# Patient Record
Sex: FEMALE | Race: WHITE | NOT HISPANIC OR LATINO | Employment: UNEMPLOYED | URBAN - METROPOLITAN AREA
[De-identification: names, ages, dates, MRNs, and addresses within clinical notes are randomized per-mention and may not be internally consistent; named-entity substitution may affect disease eponyms.]

---

## 2018-04-27 ENCOUNTER — OFFICE VISIT (OUTPATIENT)
Dept: FAMILY MEDICINE CLINIC | Facility: CLINIC | Age: 38
End: 2018-04-27

## 2018-04-27 VITALS
BODY MASS INDEX: 21.85 KG/M2 | HEART RATE: 70 BPM | SYSTOLIC BLOOD PRESSURE: 100 MMHG | OXYGEN SATURATION: 98 % | HEIGHT: 63 IN | TEMPERATURE: 98.1 F | DIASTOLIC BLOOD PRESSURE: 68 MMHG | WEIGHT: 123.3 LBS

## 2018-04-27 DIAGNOSIS — J02.9 ACUTE PHARYNGITIS, UNSPECIFIED ETIOLOGY: Primary | ICD-10-CM

## 2018-04-27 PROCEDURE — 99213 OFFICE O/P EST LOW 20 MIN: CPT | Performed by: NURSE PRACTITIONER

## 2018-04-27 NOTE — PROGRESS NOTES
Subjective   Lilia Anders is a 38 y.o. female.     Strep throat tue  [facility]  tx augmentin    mon scratchy throat  tua am star  tue fever 100.3  yest same  tyl helps      No resp sym no cough    Gets strep several   Never       Tonsils age 7                     Sore Throat    Pertinent negatives include no coughing or shortness of breath.        The following portions of the patient's history were reviewed and updated as appropriate: allergies, current medications, past family history, past medical history, past surgical history and problem list.    Review of Systems   Constitutional: Negative for chills, fatigue and fever.   HENT: Positive for sore throat.    Eyes: Negative.    Respiratory: Negative for cough and shortness of breath.    Genitourinary: Negative.    Musculoskeletal: Negative.    Skin: Negative.    Neurological: Negative.    Psychiatric/Behavioral: Negative.        Objective   Physical Exam   Constitutional: She is oriented to person, place, and time. She appears well-developed and well-nourished.   HENT:   Head: Normocephalic.   Nose: Nose normal.   Mouth/Throat: Oropharynx is clear and moist. No oropharyngeal exudate.   Mild erythema pharynx palpitation uvula midline tonsils normal speech clear   Eyes: Conjunctivae are normal. Pupils are equal, round, and reactive to light.   Neck: Neck supple.   Cardiovascular: Normal rate, regular rhythm and normal heart sounds.  Exam reveals no friction rub.    No murmur heard.  Pulmonary/Chest: Effort normal and breath sounds normal. No respiratory distress. She has no wheezes.   Abdominal: Soft. Bowel sounds are normal.   No hepatosplenomegaly    Musculoskeletal: She exhibits no edema.   Lymphadenopathy:     She has no cervical adenopathy.   Neurological: She is alert and oriented to person, place, and time.   Skin: Skin is warm and dry.   Psychiatric: She has a normal mood and affect. Her behavior is normal. Judgment and thought content normal.    Vitals reviewed.        Assessment/Plan   Lilia was seen today for sore throat.    Diagnoses and all orders for this visit:    Acute pharyngitis, unspecified etiology                  Patient is pharyngitis  Finish Augmentin  If not better by tomorrow likely viral but  Would expect should get better over the next several days  Otherwise recheck  Any difficulty unable swallow  -Fever  Severe headache chest pain shortness of breath urgent recheck ER

## 2018-05-07 ENCOUNTER — OFFICE VISIT (OUTPATIENT)
Dept: FAMILY MEDICINE CLINIC | Facility: CLINIC | Age: 38
End: 2018-05-07

## 2018-05-07 VITALS
HEIGHT: 63 IN | SYSTOLIC BLOOD PRESSURE: 104 MMHG | TEMPERATURE: 98 F | OXYGEN SATURATION: 99 % | DIASTOLIC BLOOD PRESSURE: 84 MMHG | HEART RATE: 63 BPM | BODY MASS INDEX: 21.81 KG/M2 | WEIGHT: 123.1 LBS

## 2018-05-07 DIAGNOSIS — J02.9 SORE THROAT: ICD-10-CM

## 2018-05-07 DIAGNOSIS — Z00.00 HEALTH CARE MAINTENANCE: ICD-10-CM

## 2018-05-07 DIAGNOSIS — J02.0 STREP PHARYNGITIS: Primary | ICD-10-CM

## 2018-05-07 LAB
EXPIRATION DATE: NORMAL
INTERNAL CONTROL: NORMAL
Lab: NORMAL
S PYO AG THROAT QL: NEGATIVE

## 2018-05-07 PROCEDURE — 99213 OFFICE O/P EST LOW 20 MIN: CPT | Performed by: FAMILY MEDICINE

## 2018-05-07 PROCEDURE — 87880 STREP A ASSAY W/OPTIC: CPT | Performed by: FAMILY MEDICINE

## 2018-05-07 RX ORDER — PENICILLIN V POTASSIUM 500 MG/1
500 TABLET ORAL 3 TIMES DAILY
Qty: 30 TABLET | Refills: 0 | Status: SHIPPED | OUTPATIENT
Start: 2018-05-07 | End: 2018-06-08

## 2018-05-07 RX ORDER — FLUCONAZOLE 150 MG/1
150 TABLET ORAL ONCE
Qty: 1 TABLET | Refills: 0 | Status: SHIPPED | OUTPATIENT
Start: 2018-05-07 | End: 2018-05-07

## 2018-05-07 NOTE — PROGRESS NOTES
"Subjective   Lilia Anders is a 38 y.o. female.     Chief Complaint   Patient presents with   • Sore Throat     on and off alot this year. has had strep x times this year         History of Present Illness    Sore throat since this morning.  Patient has had 2 previous documented cases of strep throat in April.  Went to [facility].  Positive rapid strep test.  Treated with amoxicillin both times.  She has in the past had fever and chills with this.  But none today.  She is leaving for a vacation in 2 days, going on a long bicycle trip for a .  She does have some psychosocial stressors.  She is worried she might have \"throat cancer\".  She has an appointment with urology throat doctor, Dr. Dawson, tomorrow.  Previous tonsillectomy as a child.        The following portions of the patient's history were reviewed and updated as appropriate: allergies, current medications, past family history, past medical history, past social history, past surgical history and problem list.          Review of Systems   Constitutional: Negative for fever.   HENT: Positive for sore throat. Negative for congestion.    Respiratory: Negative.    Gastrointestinal: Negative.        Objective   Blood pressure 104/84, pulse 63, temperature 98 °F (36.7 °C), temperature source Oral, height 160.5 cm (63.19\"), weight 55.8 kg (123 lb 1.6 oz), SpO2 99 %.  Physical Exam   Constitutional: No distress.   No acute distress.  Nontoxic.   HENT:   Right Ear: Tympanic membrane, external ear and ear canal normal.   Left Ear: Tympanic membrane, external ear and ear canal normal.   Nose: Nose normal.   There is some residual tonsillar tissue bilaterally.  Right side is moderately inflamed with some erythema but no exudates.   Eyes: Conjunctivae are normal. Right eye exhibits no discharge. Left eye exhibits no discharge. No scleral icterus.   Neck:   No cervical lymphadenopathy   Cardiovascular: Normal rate.    Pulmonary/Chest: Effort normal and breath " sounds normal. No stridor. No respiratory distress. She has no wheezes. She has no rales.   No tachypnea   Lymphadenopathy:     She has no cervical adenopathy.   Skin: No rash noted.   Nursing note and vitals reviewed.    Rapid strep is negative.    Assessment/Plan   Lilia was seen today for sore throat.    Diagnoses and all orders for this visit:    Strep pharyngitis    Sore throat  -     POC Rapid Strep A  -     Beta Strep Culture, Throat - Swab, Throat    Health care maintenance  -     CBC & Differential; Future  -     Comprehensive Metabolic Panel; Future  -     Lipid Panel; Future  -     TSH Rfx On Abnormal To Free T4; Future    Other orders  -     penicillin v potassium (VEETID) 500 MG tablet; Take 1 tablet by mouth 3 (Three) Times a Day.  -     fluconazole (DIFLUCAN) 150 MG tablet; Take 1 tablet by mouth 1 (One) Time for 1 dose.      Pharyngitis.  Possible early strep pharyngitis the false negative rapid strep.  She has had 2 previous reported positive rapid strep sent April with symptoms.  Responded to treatment.  At this time I'm concerned that one of her boys may have strep carrier state.  Going to go ahead and start treating with penicillin 3 times a day.  She has tolerated amoxicillin and Augmentin the past.  Sending off a group a strep culture.  If negative stop antibiotics.  I also gave her Diflucan she can take if needed for yeast infection.  Counseling given today with regards to psychosocial stressors.  I recommend that she keep her appointment with her ENT scheduled for tomorrow.

## 2018-05-09 LAB — S PYO THROAT QL CULT: POSITIVE

## 2018-06-08 ENCOUNTER — OFFICE VISIT (OUTPATIENT)
Dept: SURGERY | Facility: CLINIC | Age: 38
End: 2018-06-08

## 2018-06-08 VITALS — OXYGEN SATURATION: 98 % | WEIGHT: 117.8 LBS | HEIGHT: 63 IN | HEART RATE: 71 BPM | BODY MASS INDEX: 20.87 KG/M2

## 2018-06-08 DIAGNOSIS — K42.9 UMBILICAL HERNIA WITHOUT OBSTRUCTION AND WITHOUT GANGRENE: Primary | ICD-10-CM

## 2018-06-08 PROCEDURE — 99203 OFFICE O/P NEW LOW 30 MIN: CPT | Performed by: SURGERY

## 2018-06-08 RX ORDER — CEFAZOLIN SODIUM 2 G/100ML
2 INJECTION, SOLUTION INTRAVENOUS ONCE
Status: CANCELLED | OUTPATIENT
Start: 2018-06-15 | End: 2018-06-15

## 2018-06-08 NOTE — PROGRESS NOTES
General Surgery  Initial Office Visit    CC: Hernia    HPI: The patient is a pleasant 38 y.o. year-old lady who presents today for evaluation of a possible superimposed vocal hernia that she noticed about 3 weeks ago.  She states that she does a lot of heavy lifting at the gym and noticed a slight bulge just above her umbilicus.  The bulge has been intermittent in frequency and seems to disappear when she lays flat at night to go to sleep.  She has had no associated abdominal pain, nausea, vomiting, or diarrhea.  She was referred to see me for possible and vocal hernia repair.    Past Medical History: None    Past Surgical History: None    Medications: Multivitamin once daily    Allergies: Shellfish, cefaclor    Family History: No family history of gastrointestinal malignancy    Social History: , has 2 children, newly engaged and planning to get  in July (her fiancé is currently deployed), stay-at-home mom, nonsmoker, social alcohol use    ROS:   Constitutional: Negative for fevers or chills  HENT: Positive for hearing loss and tinnitus; Negative for runny nose  Eyes: Negative for vision changes or scleral icterus  Respiratory: Negative for cough or shortness of breath  Cardiovascular: Negative for chest pain or heart palpitations  Gastrointestinal: Negative for abdominal pain, nausea, vomiting, constipation, melena, or hematochezia  Genitourinary: Negative for hematuria or dysuria  Musculoskeletal: Negative for joint pain or back pain  Neurologic: Negative for headaches or dizziness  Psychiatric: Negative for anxiety or depression  All other systems reviewed and negative    Physical Exam:  Vitals:    06/08/18 0903   Pulse: 71   SpO2: 98%     General: No acute distress, well-nourished & well-developed  HEAD: normocephalic, atraumatic  EYES: normal conjunctiva, sclera anicteric  EARS: grossly normal hearing  NECK: supple, no thyromegaly  CARDIOVASCULAR: regular rate and rhythm  RESPIRATORY: clear to  auscultation bilaterally  GASTROINTESTINAL: soft, nontender, non-distended, Soft and reducible super umbilical hernia that measures less than 1 cm at the fascia  MUSCULOSKELETAL: normal gait and station. No gross extremity abnormalities  PSYCHIATRIC: oriented x3, normal mood and affect    ASSESSMENT & PLAN  Ms. Anders is a 38 showed lady with a small and reducible supraumbilical hernia.  I have recommended we proceed with open hernia repair, with the understanding that the hernia is so small she will not require any mesh.  I advised her that after surgery she'll need to avoid lifting anything heavier than 15 pounds for 6 weeks but can otherwise return to all normal activities.  She is planning to get  in July once her fiancé returns from deployment, and is unsure if she wishes to have the surgery before the wedding or after her honeymoon.  She will call us back when she is ready to schedule the surgery.    Shahida Ellis MD  General and Endoscopic Surgery  [facility]    [address], [address]  [place], [place], [address]  P: [phone]  F: [phone]

## 2018-06-12 ENCOUNTER — APPOINTMENT (OUTPATIENT)
Dept: PREADMISSION TESTING | Facility: HOSPITAL | Age: 38
End: 2018-06-12

## 2018-06-12 VITALS
HEART RATE: 61 BPM | BODY MASS INDEX: 21.09 KG/M2 | DIASTOLIC BLOOD PRESSURE: 71 MMHG | RESPIRATION RATE: 20 BRPM | OXYGEN SATURATION: 100 % | SYSTOLIC BLOOD PRESSURE: 108 MMHG | TEMPERATURE: 97.5 F | HEIGHT: 63 IN | WEIGHT: 119 LBS

## 2018-06-12 DIAGNOSIS — K42.9 UMBILICAL HERNIA WITHOUT OBSTRUCTION AND WITHOUT GANGRENE: ICD-10-CM

## 2018-06-12 LAB
ANION GAP SERPL CALCULATED.3IONS-SCNC: 11.6 MMOL/L
BUN BLD-MCNC: 16 MG/DL (ref 6–20)
BUN/CREAT SERPL: 15.5 (ref 7–25)
CALCIUM SPEC-SCNC: 9.3 MG/DL (ref 8.6–10.5)
CHLORIDE SERPL-SCNC: 99 MMOL/L (ref 98–107)
CO2 SERPL-SCNC: 30.4 MMOL/L (ref 22–29)
CREAT BLD-MCNC: 1.03 MG/DL (ref 0.57–1)
DEPRECATED RDW RBC AUTO: 42.1 FL (ref 37–54)
ERYTHROCYTE [DISTWIDTH] IN BLOOD BY AUTOMATED COUNT: 11.9 % (ref 11.7–13)
GFR SERPL CREATININE-BSD FRML MDRD: 61 ML/MIN/1.73
GLUCOSE BLD-MCNC: 60 MG/DL (ref 65–99)
HCT VFR BLD AUTO: 44.3 % (ref 35.6–45.5)
HGB BLD-MCNC: 14.7 G/DL (ref 11.9–15.5)
MCH RBC QN AUTO: 32.2 PG (ref 26.9–32)
MCHC RBC AUTO-ENTMCNC: 33.2 G/DL (ref 32.4–36.3)
MCV RBC AUTO: 96.9 FL (ref 80.5–98.2)
PLATELET # BLD AUTO: 261 10*3/MM3 (ref 140–500)
PMV BLD AUTO: 10 FL (ref 6–12)
POTASSIUM BLD-SCNC: 4.3 MMOL/L (ref 3.5–5.2)
RBC # BLD AUTO: 4.57 10*6/MM3 (ref 3.9–5.2)
SODIUM BLD-SCNC: 141 MMOL/L (ref 136–145)
WBC NRBC COR # BLD: 4.57 10*3/MM3 (ref 4.5–10.7)

## 2018-06-12 PROCEDURE — 85027 COMPLETE CBC AUTOMATED: CPT | Performed by: SURGERY

## 2018-06-12 PROCEDURE — 80048 BASIC METABOLIC PNL TOTAL CA: CPT | Performed by: SURGERY

## 2018-06-12 PROCEDURE — 36415 COLL VENOUS BLD VENIPUNCTURE: CPT

## 2018-06-12 NOTE — DISCHARGE INSTRUCTIONS
Take the following medications the morning of surgery with a small sip of water:    NONE    ARRIVE TO OUT SURGERY CENTER AT 11 A.M.    General Instructions:  • Do not eat solid food after midnight the night before surgery.  • You may drink clear liquids day of surgery but must stop at least one hour before your hospital arrival time.  • It is beneficial for you to have a clear drink that contains carbohydrates the day of surgery.  We suggest a 12 to 20 ounce bottle of Gatorade or Powerade for non-diabetic patients or a 12 to 20 ounce bottle of G2 or Powerade Zero for diabetic patients. (Pediatric patients, are not advised to drink a 12 to 20 ounce carbohydrate drink)    Clear liquids are liquids you can see through.  Nothing red in color.     Plain water                               Sports drinks  Sodas                                   Gelatin (Jell-O)  Fruit juices without pulp such as white grape juice and apple juice  Popsicles that contain no fruit or yogurt  Tea or coffee (no cream or milk added)  Gatorade / Powerade  G2 / Powerade Zero    • Infants may have breast milk up to four hours before surgery.  • Infants drinking formula may drink formula up to six hours before surgery.   • Patients who avoid smoking, chewing tobacco and alcohol for 4 weeks prior to surgery have a reduced risk of post-operative complications.  Quit smoking as many days before surgery as you can.  • Do not smoke, use chewing tobacco or drink alcohol the day of surgery.   • If applicable bring your C-PAP/ BI-PAP machine.  • Bring any papers given to you in the doctor’s office.  • Wear clean comfortable clothes and socks.  • Do not wear contact lenses or make-up.  Bring a case for your glasses.   • Bring crutches or walker if applicable.  • Remove all piercings.  Leave jewelry and any other valuables at home.  • Hair extensions with metal clips must be removed prior to surgery.  • The Pre-Admission Testing nurse will instruct you to  bring medications if unable to obtain an accurate list in Pre-Admission Testing.          Preventing a Surgical Site Infection:  • For 2 to 3 days before surgery, avoid shaving with a razor because the razor can irritate skin and make it easier to develop an infection.  • The night prior to surgery sleep in a clean bed with clean clothing.  Do not allow pets to sleep with you.  • Shower on the morning of surgery using a fresh bar of anti-bacterial soap (such as Dial) and clean washcloth.  Dry with a clean towel and dress in clean clothing.  • Ask your surgeon if you will be receiving antibiotics prior to surgery.  • Make sure you, your family, and all healthcare providers clean their hands with soap and water or an alcohol based hand  before caring for you or your wound.    Day of surgery:  Upon arrival, a Pre-op nurse and Anesthesiologist will review your health history, obtain vital signs, and answer questions you may have.  The only belongings needed at this time will be your home medications and if applicable your C-PAP/BI-PAP machine.  If you are staying overnight your family can leave the rest of your belongings in the car and bring them to your room later.  A Pre-op nurse will start an IV and you may receive medication in preparation for surgery, including something to help you relax.  Your family will be able to see you in the Pre-op area.  While you are in surgery your family should notify the waiting room  if they leave the waiting room area and provide a contact phone number.    Please be aware that surgery does come with discomfort.  We want to make every effort to control your discomfort so please discuss any uncontrolled symptoms with your nurse.   Your doctor will most likely have prescribed pain medications.      If you are going home after surgery you will receive individualized written care instructions before being discharged.  A responsible adult must drive you to and from  the hospital on the day of your surgery and stay with you for 24 hours.    If you are staying overnight following surgery, you will be transported to your hospital room following the recovery period.  [facility] has all private rooms.    If you have any questions please call Pre-Admission Testing at [phone].  Deductibles and co-payments are collected on the day of service. Please be prepared to pay the required co-pay, deductible or deposit on the day of service as defined by your plan.

## 2018-06-15 ENCOUNTER — ANESTHESIA (OUTPATIENT)
Dept: PERIOP | Facility: HOSPITAL | Age: 38
End: 2018-06-15

## 2018-06-15 ENCOUNTER — HOSPITAL ENCOUNTER (OUTPATIENT)
Facility: HOSPITAL | Age: 38
Setting detail: HOSPITAL OUTPATIENT SURGERY
Discharge: HOME | End: 2018-06-15
Attending: SURGERY | Admitting: SURGERY

## 2018-06-15 ENCOUNTER — ANESTHESIA EVENT (OUTPATIENT)
Dept: PERIOP | Facility: HOSPITAL | Age: 38
End: 2018-06-15

## 2018-06-15 VITALS
RESPIRATION RATE: 16 BRPM | OXYGEN SATURATION: 99 % | TEMPERATURE: 98.9 F | HEART RATE: 71 BPM | DIASTOLIC BLOOD PRESSURE: 83 MMHG | SYSTOLIC BLOOD PRESSURE: 116 MMHG

## 2018-06-15 DIAGNOSIS — K42.9 UMBILICAL HERNIA WITHOUT OBSTRUCTION AND WITHOUT GANGRENE: ICD-10-CM

## 2018-06-15 PROBLEM — K43.9 VENTRAL HERNIA WITHOUT OBSTRUCTION OR GANGRENE: Status: ACTIVE | Noted: 2018-06-08

## 2018-06-15 LAB
B-HCG UR QL: NEGATIVE
INTERNAL NEGATIVE CONTROL: NEGATIVE
INTERNAL POSITIVE CONTROL: POSITIVE
Lab: NORMAL

## 2018-06-15 PROCEDURE — 25010000003 CEFAZOLIN IN DEXTROSE 2-4 GM/100ML-% SOLUTION: Performed by: SURGERY

## 2018-06-15 PROCEDURE — 25010000002 DEXAMETHASONE PER 1 MG: Performed by: NURSE ANESTHETIST, CERTIFIED REGISTERED

## 2018-06-15 PROCEDURE — 25010000002 PROPOFOL 10 MG/ML EMULSION: Performed by: NURSE ANESTHETIST, CERTIFIED REGISTERED

## 2018-06-15 PROCEDURE — 49561 PR REPAIR INCISIONAL HERNIA,STRANG: CPT | Performed by: SURGERY

## 2018-06-15 PROCEDURE — 25010000002 FENTANYL CITRATE (PF) 100 MCG/2ML SOLUTION: Performed by: NURSE ANESTHETIST, CERTIFIED REGISTERED

## 2018-06-15 PROCEDURE — 25010000002 MIDAZOLAM PER 1 MG: Performed by: ANESTHESIOLOGY

## 2018-06-15 PROCEDURE — 25010000002 FENTANYL CITRATE (PF) 100 MCG/2ML SOLUTION: Performed by: ANESTHESIOLOGY

## 2018-06-15 PROCEDURE — 25010000002 ONDANSETRON PER 1 MG: Performed by: NURSE ANESTHETIST, CERTIFIED REGISTERED

## 2018-06-15 RX ORDER — FLUMAZENIL 0.1 MG/ML
0.2 INJECTION INTRAVENOUS AS NEEDED
Status: DISCONTINUED | OUTPATIENT
Start: 2018-06-15 | End: 2018-06-15 | Stop reason: HOSPADM

## 2018-06-15 RX ORDER — CEFAZOLIN SODIUM 2 G/100ML
2 INJECTION, SOLUTION INTRAVENOUS ONCE
Status: COMPLETED | OUTPATIENT
Start: 2018-06-15 | End: 2018-06-15

## 2018-06-15 RX ORDER — PROMETHAZINE HYDROCHLORIDE 25 MG/ML
6.25 INJECTION, SOLUTION INTRAMUSCULAR; INTRAVENOUS ONCE AS NEEDED
Status: DISCONTINUED | OUTPATIENT
Start: 2018-06-15 | End: 2018-06-15 | Stop reason: HOSPADM

## 2018-06-15 RX ORDER — PROPOFOL 10 MG/ML
VIAL (ML) INTRAVENOUS AS NEEDED
Status: DISCONTINUED | OUTPATIENT
Start: 2018-06-15 | End: 2018-06-15 | Stop reason: SURG

## 2018-06-15 RX ORDER — DIPHENHYDRAMINE HYDROCHLORIDE 50 MG/ML
6.25 INJECTION INTRAMUSCULAR; INTRAVENOUS
Status: DISCONTINUED | OUTPATIENT
Start: 2018-06-15 | End: 2018-06-15 | Stop reason: HOSPADM

## 2018-06-15 RX ORDER — LIDOCAINE HYDROCHLORIDE 10 MG/ML
0.5 INJECTION, SOLUTION EPIDURAL; INFILTRATION; INTRACAUDAL; PERINEURAL ONCE AS NEEDED
Status: DISCONTINUED | OUTPATIENT
Start: 2018-06-15 | End: 2018-06-15 | Stop reason: HOSPADM

## 2018-06-15 RX ORDER — OXYCODONE HYDROCHLORIDE AND ACETAMINOPHEN 5; 325 MG/1; MG/1
2 TABLET ORAL ONCE AS NEEDED
Status: DISCONTINUED | OUTPATIENT
Start: 2018-06-15 | End: 2018-06-15 | Stop reason: HOSPADM

## 2018-06-15 RX ORDER — MAGNESIUM HYDROXIDE 1200 MG/15ML
LIQUID ORAL AS NEEDED
Status: DISCONTINUED | OUTPATIENT
Start: 2018-06-15 | End: 2018-06-15 | Stop reason: HOSPADM

## 2018-06-15 RX ORDER — MIDAZOLAM HYDROCHLORIDE 1 MG/ML
1 INJECTION INTRAMUSCULAR; INTRAVENOUS
Status: DISCONTINUED | OUTPATIENT
Start: 2018-06-15 | End: 2018-06-15 | Stop reason: HOSPADM

## 2018-06-15 RX ORDER — PROMETHAZINE HYDROCHLORIDE 25 MG/1
25 TABLET ORAL ONCE AS NEEDED
Status: DISCONTINUED | OUTPATIENT
Start: 2018-06-15 | End: 2018-06-15 | Stop reason: HOSPADM

## 2018-06-15 RX ORDER — FAMOTIDINE 10 MG/ML
20 INJECTION, SOLUTION INTRAVENOUS ONCE
Status: COMPLETED | OUTPATIENT
Start: 2018-06-15 | End: 2018-06-15

## 2018-06-15 RX ORDER — HYDROMORPHONE HYDROCHLORIDE 1 MG/ML
0.5 INJECTION, SOLUTION INTRAMUSCULAR; INTRAVENOUS; SUBCUTANEOUS
Status: DISCONTINUED | OUTPATIENT
Start: 2018-06-15 | End: 2018-06-15 | Stop reason: HOSPADM

## 2018-06-15 RX ORDER — FENTANYL CITRATE 50 UG/ML
INJECTION, SOLUTION INTRAMUSCULAR; INTRAVENOUS AS NEEDED
Status: DISCONTINUED | OUTPATIENT
Start: 2018-06-15 | End: 2018-06-15 | Stop reason: SURG

## 2018-06-15 RX ORDER — FENTANYL CITRATE 50 UG/ML
50 INJECTION, SOLUTION INTRAMUSCULAR; INTRAVENOUS
Status: DISCONTINUED | OUTPATIENT
Start: 2018-06-15 | End: 2018-06-15 | Stop reason: HOSPADM

## 2018-06-15 RX ORDER — TRAMADOL HYDROCHLORIDE 50 MG/1
50 TABLET ORAL EVERY 6 HOURS PRN
Qty: 20 TABLET | Refills: 0 | Status: SHIPPED | OUTPATIENT
Start: 2018-06-15 | End: 2018-07-06

## 2018-06-15 RX ORDER — PROMETHAZINE HYDROCHLORIDE 25 MG/1
25 SUPPOSITORY RECTAL ONCE AS NEEDED
Status: DISCONTINUED | OUTPATIENT
Start: 2018-06-15 | End: 2018-06-15 | Stop reason: HOSPADM

## 2018-06-15 RX ORDER — SODIUM CHLORIDE, SODIUM LACTATE, POTASSIUM CHLORIDE, CALCIUM CHLORIDE 600; 310; 30; 20 MG/100ML; MG/100ML; MG/100ML; MG/100ML
9 INJECTION, SOLUTION INTRAVENOUS CONTINUOUS
Status: DISCONTINUED | OUTPATIENT
Start: 2018-06-15 | End: 2018-06-15 | Stop reason: HOSPADM

## 2018-06-15 RX ORDER — HYDROCODONE BITARTRATE AND ACETAMINOPHEN 7.5; 325 MG/1; MG/1
1 TABLET ORAL ONCE AS NEEDED
Status: COMPLETED | OUTPATIENT
Start: 2018-06-15 | End: 2018-06-15

## 2018-06-15 RX ORDER — BUPIVACAINE HYDROCHLORIDE AND EPINEPHRINE 5; 5 MG/ML; UG/ML
INJECTION, SOLUTION PERINEURAL AS NEEDED
Status: DISCONTINUED | OUTPATIENT
Start: 2018-06-15 | End: 2018-06-15 | Stop reason: HOSPADM

## 2018-06-15 RX ORDER — ONDANSETRON 2 MG/ML
4 INJECTION INTRAMUSCULAR; INTRAVENOUS ONCE AS NEEDED
Status: DISCONTINUED | OUTPATIENT
Start: 2018-06-15 | End: 2018-06-15 | Stop reason: HOSPADM

## 2018-06-15 RX ORDER — LABETALOL HYDROCHLORIDE 5 MG/ML
5 INJECTION, SOLUTION INTRAVENOUS
Status: DISCONTINUED | OUTPATIENT
Start: 2018-06-15 | End: 2018-06-15 | Stop reason: HOSPADM

## 2018-06-15 RX ORDER — EPHEDRINE SULFATE 50 MG/ML
5 INJECTION, SOLUTION INTRAVENOUS ONCE AS NEEDED
Status: DISCONTINUED | OUTPATIENT
Start: 2018-06-15 | End: 2018-06-15 | Stop reason: HOSPADM

## 2018-06-15 RX ORDER — SODIUM CHLORIDE 0.9 % (FLUSH) 0.9 %
1-10 SYRINGE (ML) INJECTION AS NEEDED
Status: DISCONTINUED | OUTPATIENT
Start: 2018-06-15 | End: 2018-06-15 | Stop reason: HOSPADM

## 2018-06-15 RX ORDER — ROCURONIUM BROMIDE 10 MG/ML
INJECTION, SOLUTION INTRAVENOUS AS NEEDED
Status: DISCONTINUED | OUTPATIENT
Start: 2018-06-15 | End: 2018-06-15 | Stop reason: SURG

## 2018-06-15 RX ORDER — DEXAMETHASONE SODIUM PHOSPHATE 10 MG/ML
INJECTION INTRAMUSCULAR; INTRAVENOUS AS NEEDED
Status: DISCONTINUED | OUTPATIENT
Start: 2018-06-15 | End: 2018-06-15 | Stop reason: SURG

## 2018-06-15 RX ORDER — ONDANSETRON 2 MG/ML
INJECTION INTRAMUSCULAR; INTRAVENOUS AS NEEDED
Status: DISCONTINUED | OUTPATIENT
Start: 2018-06-15 | End: 2018-06-15 | Stop reason: SURG

## 2018-06-15 RX ORDER — MIDAZOLAM HYDROCHLORIDE 1 MG/ML
2 INJECTION INTRAMUSCULAR; INTRAVENOUS
Status: DISCONTINUED | OUTPATIENT
Start: 2018-06-15 | End: 2018-06-15 | Stop reason: HOSPADM

## 2018-06-15 RX ORDER — FENTANYL CITRATE 50 UG/ML
100 INJECTION, SOLUTION INTRAMUSCULAR; INTRAVENOUS
Status: DISCONTINUED | OUTPATIENT
Start: 2018-06-15 | End: 2018-06-15 | Stop reason: HOSPADM

## 2018-06-15 RX ADMIN — ONDANSETRON 4 MG: 2 INJECTION INTRAMUSCULAR; INTRAVENOUS at 14:00

## 2018-06-15 RX ADMIN — ROCURONIUM BROMIDE 35 MG: 10 INJECTION INTRAVENOUS at 13:30

## 2018-06-15 RX ADMIN — MIDAZOLAM 2 MG: 1 INJECTION INTRAMUSCULAR; INTRAVENOUS at 13:30

## 2018-06-15 RX ADMIN — HYDROCODONE BITARTRATE AND ACETAMINOPHEN 1 TABLET: 7.5; 325 TABLET ORAL at 14:46

## 2018-06-15 RX ADMIN — MIDAZOLAM 2 MG: 1 INJECTION INTRAMUSCULAR; INTRAVENOUS at 12:08

## 2018-06-15 RX ADMIN — SUGAMMADEX 150 MG: 100 INJECTION, SOLUTION INTRAVENOUS at 13:54

## 2018-06-15 RX ADMIN — FENTANYL CITRATE 100 MCG: 50 INJECTION INTRAMUSCULAR; INTRAVENOUS at 13:30

## 2018-06-15 RX ADMIN — SODIUM CHLORIDE, POTASSIUM CHLORIDE, SODIUM LACTATE AND CALCIUM CHLORIDE 9 ML/HR: 600; 310; 30; 20 INJECTION, SOLUTION INTRAVENOUS at 14:19

## 2018-06-15 RX ADMIN — FENTANYL CITRATE 50 MCG: 50 INJECTION, SOLUTION INTRAMUSCULAR; INTRAVENOUS at 15:07

## 2018-06-15 RX ADMIN — DEXAMETHASONE SODIUM PHOSPHATE 8 MG: 10 INJECTION INTRAMUSCULAR; INTRAVENOUS at 13:39

## 2018-06-15 RX ADMIN — SODIUM CHLORIDE, POTASSIUM CHLORIDE, SODIUM LACTATE AND CALCIUM CHLORIDE 9 ML/HR: 600; 310; 30; 20 INJECTION, SOLUTION INTRAVENOUS at 11:55

## 2018-06-15 RX ADMIN — PROPOFOL 200 MG: 10 INJECTION, EMULSION INTRAVENOUS at 13:30

## 2018-06-15 RX ADMIN — CEFAZOLIN SODIUM 2 G: 2 INJECTION, SOLUTION INTRAVENOUS at 13:34

## 2018-06-15 RX ADMIN — FAMOTIDINE 20 MG: 10 INJECTION, SOLUTION INTRAVENOUS at 12:07

## 2018-06-15 NOTE — OP NOTE
Operative Note :  Shahida Ellis MD      Lilia Anders  1980    Procedure Date: 06/15/18    Pre-op Diagnosis:  · Umbilical hernia without obstruction and without gangrene [K42.9]    Post-Operative Diagnosis:  · Ventral hernia without obstruction and without gangrene    Procedure:   · Open ventral hernia repair    Surgeon: Shahida Ellis MD    Assistant: None     Anesthesia:  General (general endotracheal tube)    Estimated Blood Loss: minimal    Specimens: None    Complications: None    Indications:  · Ms. Anders is a 38 year-old lady with a small and reducible supraumbilical hernia.  I have recommended we proceed with open hernia repair, with the understanding that the hernia is so small she will not require any mesh.  I discussed the risks of the surgery to include bleeding, possible wound infection, and possible hernia recurrence. Despite these risks, she has consented to proceed.    Findings:   · 3 mm supraumbilical ventral hernia with incarcerated omentum    Description of procedure:  The patient was brought to the operating room and placed on the OR table in supine position.  An endotracheal tube was inserted, and general anesthesia was induced.  Her anterior abdominal wall was prepped and draped in a sterile fashion and a surgical timeout completed.  A supraumbilical 2 cm skin incision was made after instillation of 0.5% Marcaine with epinephrine.  The underlying subcutaneous tissues were divided using electrocutery to the level of the fascia.  Approximately 3 cm above the umbilicus, there was a small portion of omentum herniated through a 3 mm fascial defect.  This omentum was easily transected and the fascial defect inspected.  It was closed primarily using a single 0 Ethibond suture.  The skin incision was then closed primarily using a running 4-0 Vicryl subcuticular stitch and Dermabond.  The patient was transferred to the recovery area in stable condition.  All counts were correct per  nursing.    Shahida Ellis MD  General and Endoscopic Surgery  [facility]    [address], [address]  [place], [place], [address]  P: [phone]  F: [phone]

## 2018-06-15 NOTE — H&P (VIEW-ONLY)
General Surgery  Initial Office Visit    CC: Hernia    HPI: The patient is a pleasant 38 y.o. year-old lady who presents today for evaluation of a possible superimposed vocal hernia that she noticed about 3 weeks ago.  She states that she does a lot of heavy lifting at the gym and noticed a slight bulge just above her umbilicus.  The bulge has been intermittent in frequency and seems to disappear when she lays flat at night to go to sleep.  She has had no associated abdominal pain, nausea, vomiting, or diarrhea.  She was referred to see me for possible and vocal hernia repair.    Past Medical History: None    Past Surgical History: None    Medications: Multivitamin once daily    Allergies: Shellfish, cefaclor    Family History: No family history of gastrointestinal malignancy    Social History: , has 2 children, newly engaged and planning to get  in July (her fiancé is currently deployed), stay-at-home mom, nonsmoker, social alcohol use    ROS:   Constitutional: Negative for fevers or chills  HENT: Positive for hearing loss and tinnitus; Negative for runny nose  Eyes: Negative for vision changes or scleral icterus  Respiratory: Negative for cough or shortness of breath  Cardiovascular: Negative for chest pain or heart palpitations  Gastrointestinal: Negative for abdominal pain, nausea, vomiting, constipation, melena, or hematochezia  Genitourinary: Negative for hematuria or dysuria  Musculoskeletal: Negative for joint pain or back pain  Neurologic: Negative for headaches or dizziness  Psychiatric: Negative for anxiety or depression  All other systems reviewed and negative    Physical Exam:  Vitals:    06/08/18 0903   Pulse: 71   SpO2: 98%     General: No acute distress, well-nourished & well-developed  HEAD: normocephalic, atraumatic  EYES: normal conjunctiva, sclera anicteric  EARS: grossly normal hearing  NECK: supple, no thyromegaly  CARDIOVASCULAR: regular rate and rhythm  RESPIRATORY: clear to  auscultation bilaterally  GASTROINTESTINAL: soft, nontender, non-distended, Soft and reducible super umbilical hernia that measures less than 1 cm at the fascia  MUSCULOSKELETAL: normal gait and station. No gross extremity abnormalities  PSYCHIATRIC: oriented x3, normal mood and affect    ASSESSMENT & PLAN  Ms. Anders is a 38 showed lady with a small and reducible supraumbilical hernia.  I have recommended we proceed with open hernia repair, with the understanding that the hernia is so small she will not require any mesh.  I advised her that after surgery she'll need to avoid lifting anything heavier than 15 pounds for 6 weeks but can otherwise return to all normal activities.  She is planning to get  in July once her fiancé returns from deployment, and is unsure if she wishes to have the surgery before the wedding or after her honeymoon.  She will call us back when she is ready to schedule the surgery.    Shahida Ellis MD  General and Endoscopic Surgery  [facility]    [address], [address]  [place], [place], [address]  P: [phone]  F: [phone]

## 2018-06-15 NOTE — ANESTHESIA POSTPROCEDURE EVALUATION
Patient: Lilia Anders    Procedure Summary     Date:  06/15/18 Room / Location:   [facility]  /  [facility]    Anesthesia Start:  1326 Anesthesia Stop:  1408    Procedure:  OPEN VENTRAL HERNIA REPAIR (N/A Abdomen) Diagnosis:       Umbilical hernia without obstruction and without gangrene      (Umbilical hernia without obstruction and without gangrene [K42.9])    Surgeon:  Shahida Ellis MD Provider:  Sami Palacio MD    Anesthesia Type:  general ASA Status:  2          Anesthesia Type: general  Last vitals  BP   109/77 (06/15/18 1500)   Temp   37.2 °C (98.9 °F) (06/15/18 1445)   Pulse   56 (06/15/18 1500)   Resp   16 (06/15/18 1500)     SpO2   98 % (06/15/18 1500)     Post Anesthesia Care and Evaluation    Patient location during evaluation: bedside  Patient participation: complete - patient participated  Level of consciousness: awake  Pain score: 1  Pain management: adequate  Airway patency: patent  Anesthetic complications: No anesthetic complications    Cardiovascular status: acceptable  Respiratory status: acceptable  Hydration status: acceptable    Comments: --------------------            06/15/18               1500     --------------------   BP:       109/77     Pulse:      56       Resp:       16       Temp:                SpO2:      98%      --------------------

## 2018-06-15 NOTE — ANESTHESIA PREPROCEDURE EVALUATION
Anesthesia Evaluation     Patient summary reviewed and Nursing notes reviewed   NPO Solid Status: > 8 hours             Airway   Mallampati: II  TM distance: >3 FB  Neck ROM: full  no difficulty expected  Dental - normal exam     Pulmonary - negative pulmonary ROS and normal exam   Cardiovascular - negative cardio ROS and normal exam        Neuro/Psych- negative ROS  GI/Hepatic/Renal/Endo - negative ROS     Musculoskeletal (-) negative ROS    Abdominal  - normal exam   Substance History - negative use     OB/GYN negative ob/gyn ROS         Other                        Anesthesia Plan    ASA 2     general     intravenous induction   Anesthetic plan and risks discussed with patient.    Plan discussed with CRNA.

## 2018-06-15 NOTE — INTERVAL H&P NOTE
H&P reviewed. The patient was examined and there are no changes to the H&P.   Plan for supraumbilical hernia repair today.    Shahida Ellis MD  General and Endoscopic Surgery  [facility]    [address], [address]  [place], [place], [address]  P: [phone]  F: [phone]

## 2018-06-29 ENCOUNTER — OFFICE VISIT (OUTPATIENT)
Dept: SURGERY | Facility: CLINIC | Age: 38
End: 2018-06-29

## 2018-06-29 VITALS
HEART RATE: 96 BPM | OXYGEN SATURATION: 98 % | RESPIRATION RATE: 20 BRPM | WEIGHT: 117.2 LBS | BODY MASS INDEX: 20.77 KG/M2 | HEIGHT: 63 IN

## 2018-06-29 DIAGNOSIS — K42.9 UMBILICAL HERNIA WITHOUT OBSTRUCTION AND WITHOUT GANGRENE: Primary | ICD-10-CM

## 2018-06-29 PROCEDURE — 99024 POSTOP FOLLOW-UP VISIT: CPT | Performed by: SURGERY

## 2018-07-04 NOTE — PROGRESS NOTES
CHIEF COMPLAINT:   Chief Complaint   Patient presents with   • Post-op     Open ventral hernia repair       HISTORY OF PRESENT ILLNESS:  This is a 38 y.o. female who presents for a post-operative visit after undergoing an open supraumbilical hernia repair on 6/15/2018. SHe has done well since surgery with minimal abdominal pain. She has been avoiding heavy lifting as instructed.    PHYSICAL EXAM:  Lungs: Clear  Heart: RRR  ABD: Incision is healing well without any erythema or signs of infection.  Ext: no significant edema, calves nontender    A/P:  This is a 38 y.o. female patient who is S/P supraumbilical/ventral hernia repair for a tiny reducible hernia    She is healing beautifully.  I advised her she can resume any sort of cardio exercising, but should avoid heavy lifting until late July when she will be 6 weeks postop.  She should also avoid using a rowing machine, and any other exercising that involves pulling or pushing against heavy resistance.  She can follow-up with me as needed.  I cleared her to return to swimming anytime she wishes.    Shahida Ellis MD  General and Endoscopic Surgery  [facility]    [address], [address]  [place], [place], [address]  P: [phone]  F: [phone]

## 2018-07-06 ENCOUNTER — OFFICE VISIT (OUTPATIENT)
Dept: SURGERY | Facility: CLINIC | Age: 38
End: 2018-07-06

## 2018-07-06 DIAGNOSIS — K42.9 UMBILICAL HERNIA WITHOUT OBSTRUCTION AND WITHOUT GANGRENE: Primary | ICD-10-CM

## 2018-07-06 PROCEDURE — 99024 POSTOP FOLLOW-UP VISIT: CPT | Performed by: SURGERY

## 2018-07-06 NOTE — PROGRESS NOTES
CHIEF COMPLAINT:   Chief Complaint   Patient presents with   • Post-op Follow-up     Open ventral hernia repair 6/15/18       HISTORY OF PRESENT ILLNESS:  This is a 38 y.o. female who presents for a Second postoperative check because the skin and soft tissues around the repaired supraumbilical hernia feel nodular and she was worried she may have overdone it at the gym and developed a recurrent hernia.      PHYSICAL EXAM:  Lungs: Clear  Heart: RRR  ABD: Incision is healing well without any erythema or signs of infection, slight nodularity to the subcutaneous tissues due to scar deposition but no signs of a recurrent hernia  Ext: no significant edema, calves nontender    A/P:  This is a 38 y.o. female patient who is S/P supraumbilical/ventral hernia repair for a tiny reducible hernia    I reassured her today that there is no finding to suggest recurrent hernia, and that the slight nodularity of the soft tissues beneath her skin are simply due to scar tissue deposition and will soften with time.  She can follow-up with me as needed.    Shahida Ellis MD  General and Endoscopic Surgery  [facility]    [address], [address]  [place], [place], [address]  P: [phone]  F: [phone]

## 2018-08-05 ENCOUNTER — RESULTS ENCOUNTER (OUTPATIENT)
Dept: FAMILY MEDICINE CLINIC | Facility: CLINIC | Age: 38
End: 2018-08-05

## 2018-08-05 DIAGNOSIS — Z00.00 HEALTH CARE MAINTENANCE: ICD-10-CM

## 2018-12-14 ENCOUNTER — OFFICE VISIT (OUTPATIENT)
Dept: FAMILY MEDICINE CLINIC | Facility: CLINIC | Age: 38
End: 2018-12-14

## 2018-12-14 VITALS
HEART RATE: 66 BPM | DIASTOLIC BLOOD PRESSURE: 76 MMHG | BODY MASS INDEX: 22.25 KG/M2 | WEIGHT: 125.6 LBS | TEMPERATURE: 98.2 F | SYSTOLIC BLOOD PRESSURE: 124 MMHG

## 2018-12-14 DIAGNOSIS — R10.11 RUQ PAIN: Primary | ICD-10-CM

## 2018-12-14 PROCEDURE — 99214 OFFICE O/P EST MOD 30 MIN: CPT | Performed by: NURSE PRACTITIONER

## 2018-12-14 PROCEDURE — 93000 ELECTROCARDIOGRAM COMPLETE: CPT | Performed by: NURSE PRACTITIONER

## 2018-12-14 NOTE — PROGRESS NOTES
Subjective   Lilia N Chago Lea is a 38 y.o. female presents with three day history of upper abdominal spasm/squeezing sensation that has woken her from sleep. States it will get tight and then release, then continue. She also has a fluttering feeling in her abdomen at this time as well. No chest pain or shortness of breath. Denies alcohol intake over the past three nights. Worse after meals. Started as a heavy feeling in her upper abdomen, thought it may be related to anxiety, but then woke up in the middle of the night. No worsening anxiety. No GERD.     Abdominal Pain   This is a new problem. The current episode started in the past 7 days (x 3 days). The onset quality is sudden. The problem occurs intermittently. Duration: less than an hour. The problem has been waxing and waning. The pain is located in the epigastric region and RUQ. Quality: squeezing. The abdominal pain does not radiate. Pertinent negatives include no anorexia, arthralgias, belching, constipation, diarrhea, dysuria, fever, flatus, frequency, headaches, hematochezia, hematuria, melena, myalgias, nausea, vomiting or weight loss. Exacerbated by: only occurs when lying down. The pain is relieved by nothing. She has tried nothing for the symptoms. The treatment provided no relief.        The following portions of the patient's history were reviewed and updated as appropriate: allergies, current medications, past family history, past medical history, past social history, past surgical history and problem list.    Review of Systems   Constitutional: Negative for fever and weight loss.   Gastrointestinal: Positive for abdominal pain. Negative for anorexia, constipation, diarrhea, flatus, hematochezia, melena, nausea and vomiting.   Genitourinary: Negative for dysuria, frequency and hematuria.   Musculoskeletal: Negative for arthralgias and myalgias.   Neurological: Negative for headaches.       Objective   Physical Exam   Constitutional: She is oriented  to person, place, and time. She appears well-developed and well-nourished.   Cardiovascular: Normal rate, regular rhythm and normal heart sounds. Exam reveals no gallop and no friction rub.   No murmur heard.  Pulmonary/Chest: Effort normal and breath sounds normal. No stridor. No respiratory distress. She has no wheezes.   Abdominal: Soft. Bowel sounds are normal. She exhibits no distension and no mass. There is tenderness (RUQ). There is no guarding.   Neurological: She is alert and oriented to person, place, and time.   Skin: Skin is warm and dry.   Psychiatric: She has a normal mood and affect.   Vitals reviewed.      Assessment/Plan   Lilia was seen today for abdominal pain and fatigue.    Diagnoses and all orders for this visit:    RUQ pain  -     US Gallbladder; Future  -     CBC & Differential; Future  -     Comprehensive metabolic panel; Future    will schedule US, patient not NPO today  Labs at US, CMP and CBC  Follow up for worsening symptoms  EKG wnl

## 2018-12-14 NOTE — PROGRESS NOTES
Procedure     ECG 12 Lead  Date/Time: 12/14/2018 2:32 PM  Performed by: Andra Lo APRN  Authorized by: Andra Lo APRN   Previous ECG: no previous ECG available  Rhythm: sinus rhythm  Rate: normal  Conduction: conduction normal  ST Segments: ST segments normal  QRS axis: normal  Clinical impression: normal ECG

## 2018-12-19 ENCOUNTER — RESULTS ENCOUNTER (OUTPATIENT)
Dept: FAMILY MEDICINE CLINIC | Facility: CLINIC | Age: 38
End: 2018-12-19

## 2018-12-19 ENCOUNTER — HOSPITAL ENCOUNTER (OUTPATIENT)
Dept: ULTRASOUND IMAGING | Facility: HOSPITAL | Age: 38
Discharge: HOME | End: 2018-12-19
Admitting: NURSE PRACTITIONER

## 2018-12-19 DIAGNOSIS — R10.11 RUQ PAIN: ICD-10-CM

## 2018-12-19 PROCEDURE — 76705 ECHO EXAM OF ABDOMEN: CPT

## 2019-03-26 ENCOUNTER — OFFICE VISIT (OUTPATIENT)
Dept: FAMILY MEDICINE CLINIC | Facility: CLINIC | Age: 39
End: 2019-03-26

## 2019-03-26 VITALS
OXYGEN SATURATION: 98 % | BODY MASS INDEX: 22.32 KG/M2 | HEART RATE: 58 BPM | HEIGHT: 63 IN | WEIGHT: 126 LBS | TEMPERATURE: 97.6 F | DIASTOLIC BLOOD PRESSURE: 77 MMHG | SYSTOLIC BLOOD PRESSURE: 116 MMHG

## 2019-03-26 DIAGNOSIS — J06.9 VIRAL URI: Primary | ICD-10-CM

## 2019-03-26 DIAGNOSIS — R50.9 FEVER IN ADULT: ICD-10-CM

## 2019-03-26 LAB
EXPIRATION DATE: NORMAL
EXPIRATION DATE: NORMAL
FLUAV AG NPH QL: NEGATIVE
FLUBV AG NPH QL: NEGATIVE
INTERNAL CONTROL: NORMAL
INTERNAL CONTROL: NORMAL
Lab: NORMAL
Lab: NORMAL
S PYO AG THROAT QL: NEGATIVE

## 2019-03-26 PROCEDURE — 87880 STREP A ASSAY W/OPTIC: CPT | Performed by: FAMILY MEDICINE

## 2019-03-26 PROCEDURE — 99213 OFFICE O/P EST LOW 20 MIN: CPT | Performed by: FAMILY MEDICINE

## 2019-03-26 PROCEDURE — 87804 INFLUENZA ASSAY W/OPTIC: CPT | Performed by: FAMILY MEDICINE

## 2019-03-26 RX ORDER — AZITHROMYCIN 250 MG/1
TABLET, FILM COATED ORAL
Qty: 6 TABLET | Refills: 0 | Status: SHIPPED | OUTPATIENT
Start: 2019-03-26 | End: 2019-05-21

## 2019-03-26 NOTE — PROGRESS NOTES
"Subjective   Lilia N Chago Lea is a 38 y.o. female.     Chief Complaint   Patient presents with   • Fever   • Generalized Body Aches   • Fatigue        History of Present Illness    24 hours of cold symptoms.  Sinus congestion.  No sore throat.  No cough.  Some low-grade fever 100.2.  Achy.  Not severe.  She is leaving for [place] in a couple days on her honeymoon.  No sick contacts.  Otherwise doing well.  No vomiting.  No diarrhea.  No dysuria.  No change in urination.  No foul-smelling urine.  No rash.  No swollen joints.      The following portions of the patient's history were reviewed and updated as appropriate: allergies, current medications, past family history, past medical history, past social history, past surgical history and problem list.          Review of Systems   Constitutional: Negative for fatigue and fever.   HENT: Positive for congestion.    Respiratory: Negative for cough.    Gastrointestinal: Negative.    Genitourinary: Negative.    Musculoskeletal: Negative.    Skin: Negative.  Negative for rash.   Neurological: Negative.    Psychiatric/Behavioral: Negative.        Objective   Blood pressure 116/77, pulse 58, temperature 97.6 °F (36.4 °C), height 160 cm (62.99\"), weight 57.2 kg (126 lb), SpO2 98 %.  Physical Exam   Constitutional: No distress.   No acute distress.  Nontoxic.   HENT:   Right Ear: Tympanic membrane, external ear and ear canal normal.   Left Ear: Tympanic membrane, external ear and ear canal normal.   Nose: Nose normal.   Mouth/Throat: Oropharynx is clear and moist. No oropharyngeal exudate.   Eyes: Conjunctivae are normal. Right eye exhibits no discharge. Left eye exhibits no discharge. No scleral icterus.   Cardiovascular: Normal rate.   Pulmonary/Chest: Effort normal and breath sounds normal. No stridor. No respiratory distress. She has no wheezes. She has no rales.   No tachypnea   Lymphadenopathy:     She has no cervical adenopathy.   Skin: No rash noted.   Nursing note " and vitals reviewed.      Assessment/Plan   Lilia was seen today for fever, generalized body aches and fatigue.    Diagnoses and all orders for this visit:    Viral URI    Fever in adult  -     POCT Influenza A/B  -     POC Rapid Strep A    Other orders  -     azithromycin (ZITHROMAX Z-JARROD) 250 MG tablet; Take 2 tablets the first day, then 1 tablet daily for 4 days.        Viral URI.  Rapid strep and rapid influenza test negative.  Low clinical suspicion for either 1 of these.  Okay for travel at this point.  I did send a prescription for a Z-Jarrod antibiotic she can take while traveling if she gets this focal sinus symptoms or worsening cough or other concerning symptoms.  She should seek medical attention if needed.  Also pseudoephedrine over-the-counter.  Also Afrin nasal spray for the flight only.  She can call with questions.

## 2019-05-01 ENCOUNTER — APPOINTMENT (OUTPATIENT)
Dept: WOMENS IMAGING | Facility: HOSPITAL | Age: 39
End: 2019-05-01

## 2019-05-01 PROCEDURE — 77062 BREAST TOMOSYNTHESIS BI: CPT | Performed by: RADIOLOGY

## 2019-05-01 PROCEDURE — 77066 DX MAMMO INCL CAD BI: CPT | Performed by: RADIOLOGY

## 2019-05-01 PROCEDURE — 76641 ULTRASOUND BREAST COMPLETE: CPT | Performed by: RADIOLOGY

## 2019-05-01 PROCEDURE — G0279 TOMOSYNTHESIS, MAMMO: HCPCS | Performed by: RADIOLOGY

## 2019-05-21 ENCOUNTER — OFFICE VISIT (OUTPATIENT)
Dept: MAMMOGRAPHY | Facility: CLINIC | Age: 39
End: 2019-05-21

## 2019-05-21 VITALS
OXYGEN SATURATION: 99 % | DIASTOLIC BLOOD PRESSURE: 70 MMHG | HEART RATE: 56 BPM | WEIGHT: 127 LBS | BODY MASS INDEX: 22.5 KG/M2 | HEIGHT: 63 IN | TEMPERATURE: 98.8 F | SYSTOLIC BLOOD PRESSURE: 120 MMHG

## 2019-05-21 DIAGNOSIS — N63.0 LUMP OR MASS IN BREAST: Primary | ICD-10-CM

## 2019-05-21 PROCEDURE — 99203 OFFICE O/P NEW LOW 30 MIN: CPT | Performed by: SURGERY

## 2019-05-21 RX ORDER — MULTIVIT WITH MINERALS/LUTEIN
1000 TABLET ORAL DAILY
COMMUNITY
End: 2023-02-01

## 2019-05-21 NOTE — PROGRESS NOTES
Chief Complaint: Lilia Lea is a 39 y.o.. female here today for Breast Mass        History of Present Illness:  Patient presents with breast mass. Left breast.  She is a nice 39-year-old white female who felt a possible lump in the lateral aspect of the right breast approximately 1 month ago.  She obtained imaging studies and there were no abnormalities detected in the 10:30 position of the right breast where the patient had felt something.  They did however on ultrasound find a 10 mm solid, oval, elongated mass with well-defined margins in the 9 o'clock position of the left breast.  This was felt to be benign however and a six-month follow-up was recommended.  The patient states that her biggest concern was a problem with the underlying implant and not so much about her breast mass.  She does feel like the mass subsided a bit after her menstrual cycle but is now returned at about the same size.  Her family history is negative for breast cancer and the patient has not had any prior breast biopsies although she has had bilateral breast augmentation.      Review of Systems:  Review of Systems   HENT:   Positive for hearing loss and tinnitus.    Skin:        The patient denies any noticeable changes to the skin of the breast.    Psychiatric/Behavioral: Positive for decreased concentration and sleep disturbance.   All other systems reviewed and are negative.       Past Medical and Surgical History:  Breast Biopsy History:  Patient has not had a breast biopsy in the past.  Breast Cancer HIstory:  Patient does not have a past medical history of breast cancer.  Breast Operations, and year:  Breast augmentation 5/2014  Social History     Tobacco Use   Smoking Status Never Smoker   Smokeless Tobacco Never Used     Obstetric History:  Patient does not menstruate, due to an ablation in the following year:2013   Number of pregnancies:2  Number of live births: 2  Number of abortions or miscarriages: 0  Age of delivery of first  child: 25  Patient breast fed, for the following lenth of time:6 months  Length of time taking birth control pills:3 months  Patient has never taken hormone replacement    Past Surgical History:   Procedure Laterality Date   • D&C HYSTEROSCOPY N/A 02/11/2013    Diagnostic laparoscopy, ablation of minimal endometriosis, chrompertubation, diagnostic hysteroscopy, dilatation and curettage-Dr. Gerber Juarez   • D&C HYSTEROSCOPY ENDOMETRIAL ABLATION AND TUBAL STERILIZATION N/A 11/18/2013    Laparoscopic tubal ligation via coagulation, Diagnostic hysteroscopy, D&C and ThermaChoice ablation-Dr. Gerber Juarez   • ENDOSCOPY AND COLONOSCOPY N/A 03/2009    Normal   • INNER EAR SURGERY  1996    ear drum surgery w/ reconstruction in 1996   • TONSILLECTOMY AND ADENOIDECTOMY Bilateral    • UMBILICAL HERNIA REPAIR N/A 6/15/2018    Procedure: OPEN VENTRAL HERNIA REPAIR;  Surgeon: Shahida Ellis MD;  Location: [facility] OR [facility];  Service: General   • VAGINAL DELIVERY N/A 01/27/2007    Dr. Jacinta Rice   • VAGINAL DELIVERY N/A 07/27/2005    Dr. Jacinta Rice       Past Medical History:   Diagnosis Date   • Dysmenorrhea    • History of obstetrical complication     H/O shoulder dystocia(G2) and Downs Syndrome(G1)   • Menorrhagia    • Migraine        Prior Hospitalizations, other than for surgery or childbirth, and year:  None    Social History:  Patient is .  Patient has two sons.    Family History:  Family History   Problem Relation Age of Onset   • Hypertension Mother    • Hearing loss Mother    • Hypertension Father    • Hyperlipidemia Father    • Heart disease Maternal Grandfather    • Heart attack Maternal Grandfather    • Hearing loss Maternal Grandfather    • Diabetes Paternal Grandmother    • Down syndrome Son         G1, he has an ASD   • Birth defects Son    • Hearing loss Maternal Uncle    • Hearing loss Maternal Grandmother    • Malig Hyperthermia Neg Hx        Vital Signs:  Vitals:    05/21/19 0953   BP: 120/70   Pulse:  56   Temp: 98.8 °F (37.1 °C)   SpO2: 99%       Medications:    Current Outpatient Prescriptions:     Current Outpatient Medications:   •  vitamin E 1000 UNIT capsule, Take 1,000 Units by mouth Daily., Disp: , Rfl:   •  Multiple Vitamins-Minerals (MULTIVITAMIN PO), Take 1 tablet by mouth Daily., Disp: , Rfl:     Physical Examination:  General Appearance:   Patient is in no distress.  She is well kept and has an average build.   Psychiatric:  Patient with appropriate mood and affect. Alert and oriented to self, time, and place.    Breast, RIGHT:  large sized, symmetric with the contralateral side.  She has an implant in place  Breast skin is without erythema, edema, rashes.  There are no visible abnormalities upon inspection during the arm-raising maneuver or with hands on hips in the sitting position. There is no nipple retraction, discharge or nipple/areolar skin changes.There are no masses palpable in the sitting or supine positions.  The area of concern is in the very lateral aspect in the upper outer quadrant.  It feels like smooth nodular tissue to me which is made more prominent by the implant and the fact that it is at the edge of the breast tissue.    Breast, LEFT:  large sized, symmetric with the contralateral side.  He has an implant in place.  Breast skin is without erythema, edema, rashes.  There are no visible abnormalities upon inspection during the arm-raising maneuver or with hands on hips in the sitting position. There is no nipple retraction, discharge or nipple/areolar skin changes.There are no masses palpable in the sitting or supine positions.    Lymphatic:  There is no axillary, cervical, infraclavicular, or supraclavicular adenopathy bilaterally.  Eyes:  Pupils are round and reactive to light.  Cardiovascular:  Heart rate and rhythm are regular.  Respiratory:  Lungs are clear bilaterally with no crackles or wheezes in any lung field.  Gastrointestinal:  Abdomen is soft, nondistended, and  nontender.  There was no evidence of hepatosplenomegaly or abdominal mass.  There are  scars from previous surgery to repair an umbilical hernia..    Musculoskeletal:  Good strength in all 4 extremities.   There is good range of motion in both shoulders.    Skin:  No new skin lesions or rashes on the skin excluding the breast (see breast exam above).    Assessment:  1. Lump or mass in breast          Plan:  In terms of the right breast, I do not think there is anything to be concerned about.  Her physical exam along with imaging studies are benign.  The left breast is also likely not a problem.  I do agree with a six-month follow-up and will place orders for that today.  I will call her when the results are back.      CPT coding:    Next Appointment:  No Follow-up on file.            EMR Dragon/transcription disclaimer:    Much of this encounter note is an electronic transcription/translocation of spoken language to printed text.  The electronic translation of spoken language may permit erroneous, or at times, nonsensical words or phrases to be inadvertently transcribed.  Although I have reviewed the note from such areas, some may still exist.

## 2019-11-06 ENCOUNTER — APPOINTMENT (OUTPATIENT)
Dept: WOMENS IMAGING | Facility: HOSPITAL | Age: 39
End: 2019-11-06

## 2019-11-06 PROCEDURE — 76641 ULTRASOUND BREAST COMPLETE: CPT | Performed by: RADIOLOGY

## 2020-06-02 ENCOUNTER — OFFICE VISIT (OUTPATIENT)
Dept: FAMILY MEDICINE CLINIC | Facility: CLINIC | Age: 40
End: 2020-06-02

## 2020-06-02 VITALS
HEART RATE: 66 BPM | WEIGHT: 129 LBS | SYSTOLIC BLOOD PRESSURE: 109 MMHG | RESPIRATION RATE: 16 BRPM | BODY MASS INDEX: 22.86 KG/M2 | TEMPERATURE: 97.5 F | OXYGEN SATURATION: 99 % | HEIGHT: 63 IN | DIASTOLIC BLOOD PRESSURE: 67 MMHG

## 2020-06-02 DIAGNOSIS — R31.9 HEMATURIA, UNSPECIFIED TYPE: Primary | ICD-10-CM

## 2020-06-02 LAB
BILIRUB BLD-MCNC: NEGATIVE MG/DL
CLARITY, POC: CLEAR
COLOR UR: YELLOW
GLUCOSE UR STRIP-MCNC: NEGATIVE MG/DL
KETONES UR QL: NEGATIVE
LEUKOCYTE EST, POC: NEGATIVE
NITRITE UR-MCNC: NEGATIVE MG/ML
PH UR: 7 [PH] (ref 5–8)
PROT UR STRIP-MCNC: NEGATIVE MG/DL
RBC # UR STRIP: NEGATIVE /UL
SP GR UR: 1.01 (ref 1–1.03)
UROBILINOGEN UR QL: NORMAL

## 2020-06-02 PROCEDURE — 81003 URINALYSIS AUTO W/O SCOPE: CPT | Performed by: FAMILY MEDICINE

## 2020-06-02 PROCEDURE — 99213 OFFICE O/P EST LOW 20 MIN: CPT | Performed by: FAMILY MEDICINE

## 2020-06-02 RX ORDER — SULFAMETHOXAZOLE AND TRIMETHOPRIM 800; 160 MG/1; MG/1
1 TABLET ORAL 2 TIMES DAILY
Qty: 10 TABLET | Refills: 0 | Status: SHIPPED | OUTPATIENT
Start: 2020-06-02 | End: 2021-08-26

## 2020-06-02 NOTE — PROGRESS NOTES
"Subjective   Lilia Lea is a 40 y.o. female.     Chief Complaint   Patient presents with   • Blood in Urine     noticed blood in urine 1 day ago         History of Present Illness    Dysuria.  Yesterday.  Some urgency and frequency.  Some gross hematuria with pink-colored urine.  She states it was definitely not vaginal bleeding.  LMP about 2 weeks ago.  She states her menses are very regular.  She saw her gynecologist a few weeks ago because she was having some low back pain on the right side.  Had ultrasound done reportedly which was normal.  She has had no flank pain since.  She otherwise feels well.  No fevers no chills.  She has little bit of low back pain now and then.  She is been exercising.  She otherwise feels well.  Never smoker.  No family history of known of urological cancer.    The following portions of the patient's history were reviewed and updated as appropriate: allergies, current medications, past family history, past medical history, past social history, past surgical history and problem list.          Review of Systems   Constitutional: Negative for fever.   Genitourinary: Positive for dysuria and hematuria.       Objective   Blood pressure 109/67, pulse 66, temperature 97.5 °F (36.4 °C), resp. rate 16, height 160 cm (63\"), weight 58.5 kg (129 lb), SpO2 99 %.  Physical Exam   Constitutional: No distress.   HENT:   Head: Atraumatic.   Cardiovascular: Normal rate.   Pulmonary/Chest: Effort normal.   Abdominal: Soft. She exhibits no distension and no mass. There is no tenderness. There is no rebound and no guarding. No hernia.   No CVA tenderness to percussion.  No pain to deep palpation of the abdomen.    Point-of-care ultrasound suprapubic.  Bladder not distended.       Assessment/Plan   Lilia was seen today for blood in urine.    Diagnoses and all orders for this visit:    Hematuria, unspecified type  -     POC Urinalysis Dipstick, Automated      Hematuria.  Resolved.  Dysuria.  Continuing.  " Urinalysis today is underwhelming.  Possibly resolving UTI.  I am treating her with Bactrim twice a day for 3 to 5 days.  Pending urine culture report.  We should have her back in 2 days.  If she has recurrent hematuria, especially in the presence of a normal urine culture, will need further investigation.  She understands to contact us with recurrent UTI symptoms or recurrent hematuria.

## 2020-09-14 ENCOUNTER — NURSE TRIAGE (OUTPATIENT)
Dept: CALL CENTER | Facility: HOSPITAL | Age: 40
End: 2020-09-14

## 2020-09-14 NOTE — TELEPHONE ENCOUNTER
" Call came per the HUB- Left calf pain. Her  is deployed in the service. She explains she was running walking for 30 minutes on Friday. She does this several times a week. The left calf is painful when she put her foot down to walk. Advised per care advice that going to a urgent care would be the best. She does have an office apt at 1345 pm today. She is going to speak with her . Advised at any time she experiences sob, cp the pain is worse, swelling or cannot put weight on the foot to be seen sooner.     Reason for Disposition  • [1] Thigh, calf, or ankle swelling AND [2] only 1 side    Additional Information  • Negative: Looks like a broken bone or dislocated joint (e.g., crooked or deformed)  • Negative: Sounds like a life-threatening emergency to the triager  • Negative: Followed a leg injury  • Negative: Leg swelling is main symptom  • Negative: Back pain radiating (shooting) into leg(s)  • Negative: Knee pain is main symptom  • Negative: Ankle pain is main symptom  • Negative: Pregnant  • Negative: Postpartum (from 0 to 6 weeks after delivery)  • Negative: Chest pain  • Negative: Difficulty breathing  • Negative: Entire foot is cool or blue in comparison to other side  • Negative: Unable to walk  • Negative: [1] Red area or streak AND [2] fever  • Negative: [1] Swollen joint AND [2] fever  • Negative: [1] Cast on leg or ankle AND [2] now increased pain  • Negative: Patient sounds very sick or weak to the triager  • Negative: [1] SEVERE pain (e.g., excruciating, unable to do any normal activities) AND [2] not improved after 2 hours of pain medicine  • Negative: [1] Thigh or calf pain AND [2] only 1 side AND [3] present > 1 hour    Answer Assessment - Initial Assessment Questions  1. ONSET: \"When did the pain start?\"      She has left calf pain, Her  is deployed. It started 5 to 6 pm. She ran on Friday.Walked ran for 30 min.   2. LOCATION: \"Where is the pain located?\"       Blow the knee " "cap. It is behinf the knee as well.   3. PAIN: \"How bad is the pain?\"    (Scale 1-10; or mild, moderate, severe)    -  MILD (1-3): doesn't interfere with normal activities     -  MODERATE (4-7): interferes with normal activities (e.g., work or school) or awakens from sleep, limping     -  SEVERE (8-10): excruciating pain, unable to do any normal activities, unable to walk      4 to 7.   4. WORK OR EXERCISE: \"Has there been any recent work or exercise that involved this part of the body?\"       She did run and walk.   5. CAUSE: \"What do you think is causing the leg pain?\"      Run and walk  6. OTHER SYMPTOMS: \"Do you have any other symptoms?\" (e.g., chest pain, back pain, breathing difficulty, swelling, rash, fever, numbness, weakness)      no  7. PREGNANCY: \"Is there any chance you are pregnant?\" \"When was your last menstrual period?\"      no    Protocols used: LEG PAIN-ADULT-AH      "

## 2020-11-03 ENCOUNTER — APPOINTMENT (OUTPATIENT)
Dept: WOMENS IMAGING | Facility: HOSPITAL | Age: 40
End: 2020-11-03

## 2020-11-03 PROCEDURE — 76641 ULTRASOUND BREAST COMPLETE: CPT | Performed by: RADIOLOGY

## 2020-11-03 PROCEDURE — G0279 TOMOSYNTHESIS, MAMMO: HCPCS

## 2020-11-03 PROCEDURE — G0279 TOMOSYNTHESIS, MAMMO: HCPCS | Performed by: RADIOLOGY

## 2020-11-03 PROCEDURE — 77066 DX MAMMO INCL CAD BI: CPT | Performed by: RADIOLOGY

## 2020-11-03 PROCEDURE — MDREVIEWSP: Performed by: RADIOLOGY

## 2020-11-03 PROCEDURE — 77065 DX MAMMO INCL CAD UNI: CPT

## 2020-11-03 PROCEDURE — 77062 BREAST TOMOSYNTHESIS BI: CPT | Performed by: RADIOLOGY

## 2021-08-26 ENCOUNTER — TRANSCRIBE ORDERS (OUTPATIENT)
Dept: FAMILY MEDICINE CLINIC | Facility: CLINIC | Age: 41
End: 2021-08-26

## 2021-08-26 ENCOUNTER — LAB (OUTPATIENT)
Dept: LAB | Facility: HOSPITAL | Age: 41
End: 2021-08-26

## 2021-08-26 ENCOUNTER — HOSPITAL ENCOUNTER (OUTPATIENT)
Dept: ULTRASOUND IMAGING | Facility: HOSPITAL | Age: 41
Discharge: HOME | End: 2021-08-26

## 2021-08-26 ENCOUNTER — OFFICE VISIT (OUTPATIENT)
Dept: FAMILY MEDICINE CLINIC | Facility: CLINIC | Age: 41
End: 2021-08-26

## 2021-08-26 VITALS
TEMPERATURE: 97.7 F | WEIGHT: 120.2 LBS | BODY MASS INDEX: 22.12 KG/M2 | OXYGEN SATURATION: 100 % | HEART RATE: 61 BPM | HEIGHT: 62 IN | DIASTOLIC BLOOD PRESSURE: 88 MMHG | SYSTOLIC BLOOD PRESSURE: 128 MMHG

## 2021-08-26 DIAGNOSIS — N83.201 CYST OF RIGHT OVARY: ICD-10-CM

## 2021-08-26 DIAGNOSIS — R10.2 PELVIC PAIN IN FEMALE: ICD-10-CM

## 2021-08-26 DIAGNOSIS — N83.201 RIGHT OVARIAN CYST: ICD-10-CM

## 2021-08-26 DIAGNOSIS — R10.2 PELVIC PAIN: Primary | ICD-10-CM

## 2021-08-26 DIAGNOSIS — R10.9 STOMACH PAIN: ICD-10-CM

## 2021-08-26 DIAGNOSIS — R10.9 STOMACH PAIN: Primary | ICD-10-CM

## 2021-08-26 DIAGNOSIS — S01.81XA CHIN LACERATION, INITIAL ENCOUNTER: ICD-10-CM

## 2021-08-26 DIAGNOSIS — R10.2 PELVIC PAIN: ICD-10-CM

## 2021-08-26 DIAGNOSIS — R55 VASOVAGAL SYNCOPE: ICD-10-CM

## 2021-08-26 PROBLEM — H90.2 CONDUCTIVE HEARING LOSS: Status: ACTIVE | Noted: 2021-08-26

## 2021-08-26 PROBLEM — Z87.19 HISTORY OF HEMORRHOIDS: Status: ACTIVE | Noted: 2021-08-26

## 2021-08-26 LAB
ALBUMIN SERPL-MCNC: 5 G/DL (ref 3.5–5.2)
ALBUMIN/GLOB SERPL: 1.8 G/DL
ALP SERPL-CCNC: 41 U/L (ref 39–117)
ALT SERPL W P-5'-P-CCNC: 20 U/L (ref 1–33)
ANION GAP SERPL CALCULATED.3IONS-SCNC: 9.5 MMOL/L (ref 5–15)
AST SERPL-CCNC: 26 U/L (ref 1–32)
B-HCG UR QL: NEGATIVE
BASOPHILS # BLD AUTO: 0.02 10*3/MM3 (ref 0–0.2)
BASOPHILS NFR BLD AUTO: 0.2 % (ref 0–1.5)
BILIRUB BLD-MCNC: NEGATIVE MG/DL
BILIRUB SERPL-MCNC: 1.2 MG/DL (ref 0–1.2)
BUN SERPL-MCNC: 19 MG/DL (ref 6–20)
BUN/CREAT SERPL: 19.8 (ref 7–25)
CALCIUM SPEC-SCNC: 9.6 MG/DL (ref 8.6–10.5)
CHLORIDE SERPL-SCNC: 102 MMOL/L (ref 98–107)
CLARITY, POC: CLEAR
CO2 SERPL-SCNC: 27.5 MMOL/L (ref 22–29)
COLOR UR: YELLOW
CREAT SERPL-MCNC: 0.96 MG/DL (ref 0.57–1)
DEPRECATED RDW RBC AUTO: 41.6 FL (ref 37–54)
EOSINOPHIL # BLD AUTO: 0.03 10*3/MM3 (ref 0–0.4)
EOSINOPHIL NFR BLD AUTO: 0.3 % (ref 0.3–6.2)
ERYTHROCYTE [DISTWIDTH] IN BLOOD BY AUTOMATED COUNT: 12.1 % (ref 12.3–15.4)
GFR SERPL CREATININE-BSD FRML MDRD: 65 ML/MIN/1.73
GLOBULIN UR ELPH-MCNC: 2.8 GM/DL
GLUCOSE SERPL-MCNC: 98 MG/DL (ref 65–99)
GLUCOSE UR STRIP-MCNC: NEGATIVE MG/DL
HCT VFR BLD AUTO: 45.8 % (ref 34–46.6)
HGB BLD-MCNC: 15.3 G/DL (ref 12–15.9)
IMM GRANULOCYTES # BLD AUTO: 0.07 10*3/MM3 (ref 0–0.05)
IMM GRANULOCYTES NFR BLD AUTO: 0.8 % (ref 0–0.5)
INTERNAL NEGATIVE CONTROL: NEGATIVE
INTERNAL POSITIVE CONTROL: POSITIVE
KETONES UR QL: NEGATIVE
LEUKOCYTE EST, POC: NEGATIVE
LYMPHOCYTES # BLD AUTO: 1.79 10*3/MM3 (ref 0.7–3.1)
LYMPHOCYTES NFR BLD AUTO: 19.9 % (ref 19.6–45.3)
Lab: NORMAL
MCH RBC QN AUTO: 31.3 PG (ref 26.6–33)
MCHC RBC AUTO-ENTMCNC: 33.4 G/DL (ref 31.5–35.7)
MCV RBC AUTO: 93.7 FL (ref 79–97)
MONOCYTES # BLD AUTO: 0.45 10*3/MM3 (ref 0.1–0.9)
MONOCYTES NFR BLD AUTO: 5 % (ref 5–12)
NEUTROPHILS NFR BLD AUTO: 6.65 10*3/MM3 (ref 1.7–7)
NEUTROPHILS NFR BLD AUTO: 73.8 % (ref 42.7–76)
NITRITE UR-MCNC: NEGATIVE MG/ML
NRBC BLD AUTO-RTO: 0 /100 WBC (ref 0–0.2)
PH UR: 7 [PH] (ref 5–8)
PLATELET # BLD AUTO: 358 10*3/MM3 (ref 140–450)
PMV BLD AUTO: 9.4 FL (ref 6–12)
POTASSIUM SERPL-SCNC: 4.4 MMOL/L (ref 3.5–5.2)
PROT SERPL-MCNC: 7.8 G/DL (ref 6–8.5)
PROT UR STRIP-MCNC: NEGATIVE MG/DL
RBC # BLD AUTO: 4.89 10*6/MM3 (ref 3.77–5.28)
RBC # UR STRIP: NEGATIVE /UL
SODIUM SERPL-SCNC: 139 MMOL/L (ref 136–145)
SP GR UR: 1.01 (ref 1–1.03)
UROBILINOGEN UR QL: NORMAL
WBC # BLD AUTO: 9.01 10*3/MM3 (ref 3.4–10.8)

## 2021-08-26 PROCEDURE — 76856 US EXAM PELVIC COMPLETE: CPT

## 2021-08-26 PROCEDURE — 36415 COLL VENOUS BLD VENIPUNCTURE: CPT | Performed by: FAMILY MEDICINE

## 2021-08-26 PROCEDURE — 93976 VASCULAR STUDY: CPT

## 2021-08-26 PROCEDURE — 81003 URINALYSIS AUTO W/O SCOPE: CPT | Performed by: FAMILY MEDICINE

## 2021-08-26 PROCEDURE — 76830 TRANSVAGINAL US NON-OB: CPT

## 2021-08-26 PROCEDURE — 99215 OFFICE O/P EST HI 40 MIN: CPT | Performed by: FAMILY MEDICINE

## 2021-08-26 PROCEDURE — 80053 COMPREHEN METABOLIC PANEL: CPT | Performed by: FAMILY MEDICINE

## 2021-08-26 PROCEDURE — 81025 URINE PREGNANCY TEST: CPT | Performed by: FAMILY MEDICINE

## 2021-08-26 PROCEDURE — 85025 COMPLETE CBC W/AUTO DIFF WBC: CPT | Performed by: FAMILY MEDICINE

## 2021-08-26 RX ORDER — EPINEPHRINE 0.3 MG/.3ML
INJECTION SUBCUTANEOUS
COMMUNITY
Start: 2021-06-11

## 2021-08-26 NOTE — PROGRESS NOTES
"Chief Complaint  Abdominal Pain (x this morning ) and Syncope (x this morning- pt called 911 this morning )    Subjective          Lilia Lea presents to [facility]  History of Present Illness     Patient got out of bed this morning.  Felt fine.  Urinated.  Felt fine.  Then started walking around and had a sudden onset of severe deep pelvic pain that radiated to her anus and tailbone.  It put her down on the floor with severe sweats and not feeling well.  It was severe stabbing pain.  It lasted a number of minutes.  She stood up.  Walking to the kitchen.  And had a syncopal episode.  She remembers falling hitting her chin on the side of the counter.  She did not get knocked out.  She called 911.  The ambulance came.  By the time EMS came she was feeling much better.  She states that her blood pressure was low although when they checked it.  She refused ambulance to the emergency room since she was feeling better and had to watch her boys at home.    She recalls having more mild pain that was similar a few days ago.  Otherwise she has felt fine.  Other than when she had severe pain she has had no nausea.  There has been no vomiting.  LMP about 3 weeks ago.  She states she has had a tubal ligation.  She has had no changes in bowel movements.  No diarrhea.  She said no change in urination.  No dysuria.  No hematuria.  Has had no fevers chills.  No epigastric abdominal discomfort.  She otherwise feels fine.  She is overdue to see her gynecologist.  Is been at least couple years.    In addition to the tubal ligation she has had previous diagnostic laparoscopies and also previous umbilical hernia repair in 2018.    Objective   Vital Signs:   /88   Pulse 61   Temp 97.7 °F (36.5 °C) (Temporal)   Ht 157.5 cm (62\")   Wt 54.5 kg (120 lb 3.2 oz)   SpO2 100%   BMI 21.98 kg/m²     Physical Exam  Constitutional:       Comments: She looks tired but otherwise comfortable.   HENT:      " Head:      Comments: There is a 1 cm full-thickness laceration on her chin.  The teeth are not male occluded.  Pupils are equal and reactive to light.  Extraocular muscles take all direction.  Face is symmetric.  No other head or neck trauma noted.  Eyes:      Extraocular Movements: Extraocular movements intact.      Pupils: Pupils are equal, round, and reactive to light.   Cardiovascular:      Rate and Rhythm: Normal rate and regular rhythm.      Pulses: Normal pulses.      Heart sounds: Normal heart sounds.   Pulmonary:      Effort: Pulmonary effort is normal.      Breath sounds: Normal breath sounds.   Abdominal:      Comments: Abdomen is soft.  Flat.  She has minimal tenderness to deep palpation at the right suprapubic area.  The pain is very mild.  She has no involuntary or voluntary guarding.  There is no rigidity.  There is no rebound tenderness.  There is no palpable mass.    Quick look point-of-care ultrasound reveals a distended bladder.  There appears to be a right adnexal cyst about 2 cm in size.  There is no fluid movement within the cyst with Doppler.  There appears to be a small rim of fluid around the cyst.  The right ovary may be visualized.  Possible other cyst within the ovary.  Formal ultrasound indicated.  There is no free fluid underneath the bladder.  There is some questionable free fluid behind the uterus which appears to be in a neutral position.   Musculoskeletal:      Cervical back: Normal range of motion and neck supple. No tenderness.        Result Review :                 Assessment and Plan    Diagnoses and all orders for this visit:    1. Pelvic pain (Primary)  -     US Pelvis Complete; Future  -     CBC & Differential  -     Comprehensive Metabolic Panel  -     Cancel: US Non-ob Transvaginal; Future  -     US testicular or ovarian vascular limited; Future  -     US Pelvis Transvaginal Non OB; Future    2. Stomach pain  -     POC Urinalysis Dipstick, Automated  -     POC Pregnancy,  Urine  -     US Pelvis Complete; Future  -     CBC & Differential  -     Comprehensive Metabolic Panel  -     Cancel: US Non-ob Transvaginal; Future  -     US testicular or ovarian vascular limited; Future  -     US Pelvis Transvaginal Non OB; Future    3. Cyst of right ovary  -     US Pelvis Complete; Future  -     Cancel: US Non-ob Transvaginal; Future  -     US testicular or ovarian vascular limited; Future  -     US Pelvis Transvaginal Non OB; Future    4. Vasovagal syncope    5. Chin laceration, initial encounter      Acute pelvic pain.  Probable ruptured ovarian cyst.  Urinalysis today no hematuria or signs of urinary tract infection.  The urine pregnancy test is negative.  Her abdomen is nonacute.  The symptoms are much improved.  I am recommending a stat pelvic ultrasound.  Patient also understands go directly to the emergency room for recurrent severe pain.  Follow-up with gynecology.  Also checking CBC and CMP today.  I will see her for follow-up pending test results.    Appendicitis unlikely.  Ectopic pregnancy very unlikely, urine beta-hCG negative today.  No evidence of acute abdomen.    Vasovagal syncope.  Most likely cause.  Related to the severe pelvic pain.  She is nontoxic.  She had no cardiopulmonary symptoms otherwise such as palpitations.  She has good exercise tolerance.  Observation ordered.  With recurrent symptoms, seek medical attention.    2 laceration.  Likely needs either stitches or Dermabond.  I do not have the capacity to repair this in our office.  I recommend she get to the urgent care center.    At this time no evidence of concussion or other head injury.    Total duration of encounter 1 hour including management, ordering test, and speaking with radiology.  With greater than 30 minutes of face-to-face time with patient.      Follow Up   No follow-ups on file.  Patient was given instructions and counseling regarding her condition or for health maintenance advice. Please see  specific information pulled into the AVS if appropriate.

## 2021-08-26 NOTE — PATIENT INSTRUCTIONS
Your history and exam findings are very consistent with a probable ruptured ovarian cyst.  The fact you feel much better now is very reassuring.  Also the fact that you have just minimal discomfort on exam today is also reassuring.  As we discussed, the point-of-care ultrasound here in the office demonstrates a right-sided ovarian cyst with what appears to be a rim of fluid.  However my ultrasound exam is very limited and I am recommending a stat pelvic ultrasound today through [facility].    At this time ibuprofen is okay to take over-the-counter.    If you have very severe pain like you had earlier today, I recommend you go directly to the emergency room.    With regards to the passout episode, that was very likely vasovagal syncope from your intense discomfort.  Likely not a cardiac or other problem.  We will continue to monitor it.  With recurrent passout episode seek medical attention immediately.    With regards to the small laceration on your chin, I recommend he get to the urgent care center today to have it further looked at and repaired.  I do not have the means to repair in the office here.    If you have very severe headaches vomiting or other concussion-like symptoms, please let us know or seek medical attention immediately.

## 2021-11-08 ENCOUNTER — OFFICE VISIT (OUTPATIENT)
Dept: GASTROENTEROLOGY | Facility: CLINIC | Age: 41
End: 2021-11-08

## 2021-11-08 ENCOUNTER — TELEPHONE (OUTPATIENT)
Dept: GASTROENTEROLOGY | Facility: CLINIC | Age: 41
End: 2021-11-08

## 2021-11-08 VITALS
WEIGHT: 123.2 LBS | DIASTOLIC BLOOD PRESSURE: 78 MMHG | BODY MASS INDEX: 21.83 KG/M2 | OXYGEN SATURATION: 97 % | TEMPERATURE: 98.2 F | SYSTOLIC BLOOD PRESSURE: 118 MMHG | HEART RATE: 69 BPM | HEIGHT: 63 IN

## 2021-11-08 DIAGNOSIS — R10.30 LOWER ABDOMINAL PAIN: Primary | ICD-10-CM

## 2021-11-08 PROCEDURE — 99204 OFFICE O/P NEW MOD 45 MIN: CPT | Performed by: INTERNAL MEDICINE

## 2021-11-08 RX ORDER — SODIUM CHLORIDE, SODIUM LACTATE, POTASSIUM CHLORIDE, CALCIUM CHLORIDE 600; 310; 30; 20 MG/100ML; MG/100ML; MG/100ML; MG/100ML
30 INJECTION, SOLUTION INTRAVENOUS CONTINUOUS
Status: CANCELLED | OUTPATIENT
Start: 2021-12-14

## 2021-11-08 RX ORDER — ERGOCALCIFEROL (VITAMIN D2) 10 MCG
400 TABLET ORAL DAILY
COMMUNITY

## 2021-11-08 NOTE — TELEPHONE ENCOUNTER
SW Magdalene for colonoscopy on 12/14  arrive at 1030am   . Gave Prep instructions in office. ----miralax    Advised PT  that  will call with final arrival time  24 hrs before procedure. If they do not get a phone call, arrival time will stay the same as given on instructions

## 2021-11-08 NOTE — PROGRESS NOTES
Chief Complaint   Patient presents with   • Abdominal Pain     Subjective   HPI  Lilia Lea is a 41 y.o. female who presents today for new patient evaluation.  She is a self-referral for abdominal pain.  Pain primarily in the suprapubic and pelvic region.  She had a episode in August of this year that she describes as being extremely intense actually causing her to nearly pass out.  She denies any change in bowel habit associated with this episode.  It was not related to food intake.  She has seen her gynecologist and pelvic ultrasound has been performed which did not show any obvious abnormalities.  U/A was normal.  She reports a normal colonoscopy about 10 to 12 years ago done for not entirely dissimilar symptoms.  She believes she may have some history of colon cancer in second-degree relatives on her mother side.    Objective   Vitals:    11/08/21 1337   BP: 118/78   Pulse: 69   Temp: 98.2 °F (36.8 °C)   SpO2: 97%     Physical Exam  Vitals reviewed.   Constitutional:       Appearance: She is well-developed.   Cardiovascular:      Rate and Rhythm: Normal rate and regular rhythm.      Heart sounds: Normal heart sounds.   Pulmonary:      Effort: Pulmonary effort is normal. No respiratory distress.      Breath sounds: Normal breath sounds. No wheezing.   Abdominal:      General: Bowel sounds are normal.      Palpations: Abdomen is soft.   Musculoskeletal:      Cervical back: Normal range of motion and neck supple.   Skin:     General: Skin is warm and dry.   Neurological:      Mental Status: She is alert and oriented to person, place, and time.   Psychiatric:         Mood and Affect: Mood and affect normal.         Behavior: Behavior normal.         Thought Content: Thought content normal.         Cognition and Memory: Memory normal.         Judgment: Judgment normal.              Assessment/Plan   Assessment:     1. Lower abdominal pain      Plan:   Patient's suprapubic/pelvic pain not entirely convincing from a  GI standpoint given her negative gynecological work-up we will arrange for her to have a colonoscopy.  If colonoscopy is negative I would then consider a CT scan of the abdomen and pelvis to complete her evaluation.          Ciaran Valenzuela M.D.  [facility]  [address] [place] [address]  Office: [phone]

## 2021-11-30 ENCOUNTER — APPOINTMENT (OUTPATIENT)
Dept: WOMENS IMAGING | Facility: HOSPITAL | Age: 41
End: 2021-11-30

## 2021-11-30 PROCEDURE — 77067 SCR MAMMO BI INCL CAD: CPT | Performed by: RADIOLOGY

## 2021-11-30 PROCEDURE — 77063 BREAST TOMOSYNTHESIS BI: CPT | Performed by: RADIOLOGY

## 2022-11-01 ENCOUNTER — OFFICE VISIT (OUTPATIENT)
Dept: SURGERY | Facility: CLINIC | Age: 42
End: 2022-11-01

## 2022-11-01 VITALS — HEIGHT: 63 IN | BODY MASS INDEX: 21.86 KG/M2 | WEIGHT: 123.4 LBS

## 2022-11-01 DIAGNOSIS — R19.09 LUMP IN THE GROIN: Primary | ICD-10-CM

## 2022-11-01 PROCEDURE — 99203 OFFICE O/P NEW LOW 30 MIN: CPT | Performed by: SURGERY
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
## 2022-11-04 ENCOUNTER — TELEPHONE (OUTPATIENT)
Dept: SURGERY | Facility: CLINIC | Age: 42
End: 2022-11-04

## 2022-11-04 NOTE — TELEPHONE ENCOUNTER
Caller: Lilia Lea    Relationship to patient:     Best call back number: [phone]    Patient is needing:  PRIMARY CARE PHYSICIAN IS REQUESTING LAST PROGRESS NOTE FROM DR FLORES   PCP JENNIFER OROZCO  FAX NUMBER [phone]

## 2022-11-04 NOTE — TELEPHONE ENCOUNTER
Patient was informed that the last Progress Note from 11/1/2022 by Dr. Ellis has been sent to her PCP. The patient will call back should her PCP not receive it.

## 2022-12-07 ENCOUNTER — HOSPITAL ENCOUNTER (OUTPATIENT)
Dept: ULTRASOUND IMAGING | Facility: HOSPITAL | Age: 42
Discharge: HOME | End: 2022-12-07
Admitting: SURGERY

## 2022-12-07 DIAGNOSIS — R19.09 LUMP IN THE GROIN: ICD-10-CM

## 2022-12-07 PROCEDURE — 76882 US LMTD JT/FCL EVL NVASC XTR: CPT

## 2022-12-12 ENCOUNTER — TELEPHONE (OUTPATIENT)
Dept: SURGERY | Facility: CLINIC | Age: 42
End: 2022-12-12

## 2022-12-12 NOTE — TELEPHONE ENCOUNTER
Left message for patient advising that she does not have any specific limitations and that she can exercise as tolerated. Also advised that I am going to send a message to Dr. Ellis and that she may call pt to discuss results or she may want to see her in the office again.

## 2022-12-12 NOTE — TELEPHONE ENCOUNTER
Provider: DR FLORES    Caller: AGUS QUINTANA    Relationship to Patient:SELF    Phone Number:[phone]    Reason for Call: PT HAD A US Nonvascular Extremity Limited DONE ON 12-7-22 AND SHE READ SHE HAS A small inguinal hernia in the left groin. SHE WOULD LIKE TO KNOW IF SHE CAN WORK OUT WITHOUT LIMITATIONS AND SHOULD SHE COME BACK IN FOR A FOLLOW UP?      PLEASE CALL PT.      When was the patient last seen: 11-1-22

## 2022-12-14 ENCOUNTER — TELEPHONE (OUTPATIENT)
Dept: SURGERY | Facility: CLINIC | Age: 42
End: 2022-12-14

## 2022-12-14 ENCOUNTER — PREP FOR SURGERY (OUTPATIENT)
Dept: OTHER | Facility: HOSPITAL | Age: 42
End: 2022-12-14

## 2022-12-14 DIAGNOSIS — R19.09 LUMP IN THE GROIN: ICD-10-CM

## 2022-12-14 DIAGNOSIS — K40.90 LEFT INGUINAL HERNIA: Primary | ICD-10-CM

## 2022-12-14 RX ORDER — CEFAZOLIN SODIUM 2 G/100ML
2 INJECTION, SOLUTION INTRAVENOUS ONCE
Status: CANCELLED | OUTPATIENT
Start: 2022-12-14 | End: 2022-12-14

## 2022-12-14 NOTE — TELEPHONE ENCOUNTER
I called Lilia regarding her left groin ultrasound findings which demonstrate a fat-containing inguinal hernia that on my exam appears to be incarcerated.  There was no evidence for bowel involvement.  I would recommend electively she have this repaired via a laparoscopic robotic assisted left inguinal hernia repair with mesh.  We discussed the risks of the procedure which include but are not limited to bleeding, hernia recurrence, wound infection, and possible mesh complications such as erosion into the bowel.  She understands these risks and has consented to proceed with surgery.    She is scheduled to have a hysterectomy with Dr. Gerber Juarez on 2/6/2023.  I would be happy to do the inguinal hernia repair at the same time, since Dr. Juarez is performing the hysterectomy on the robot.  Could someone reach out to [facility] and work on getting this arranged to be a combination case with Dr. Juarez?  I am happy to let her go first.    Thanks,  NOREEN

## 2023-01-20 ENCOUNTER — APPOINTMENT (OUTPATIENT)
Dept: WOMENS IMAGING | Facility: HOSPITAL | Age: 43
End: 2023-01-20
Payer: COMMERCIAL

## 2023-01-20 PROCEDURE — 77063 BREAST TOMOSYNTHESIS BI: CPT | Performed by: RADIOLOGY

## 2023-01-20 PROCEDURE — 77067 SCR MAMMO BI INCL CAD: CPT | Performed by: RADIOLOGY

## 2023-02-01 ENCOUNTER — PRE-ADMISSION TESTING (OUTPATIENT)
Dept: PREADMISSION TESTING | Facility: HOSPITAL | Age: 43
End: 2023-02-01
Payer: COMMERCIAL

## 2023-02-01 LAB
ABO GROUP BLD: NORMAL
ANION GAP SERPL CALCULATED.3IONS-SCNC: 10.5 MMOL/L (ref 5–15)
BASOPHILS # BLD AUTO: 0.03 10*3/MM3 (ref 0–0.2)
BASOPHILS NFR BLD AUTO: 0.4 % (ref 0–1.5)
BLD GP AB SCN SERPL QL: NEGATIVE
BUN SERPL-MCNC: 23 MG/DL (ref 6–20)
BUN/CREAT SERPL: 26.1 (ref 7–25)
CALCIUM SPEC-SCNC: 9 MG/DL (ref 8.6–10.5)
CHLORIDE SERPL-SCNC: 100 MMOL/L (ref 98–107)
CO2 SERPL-SCNC: 28.5 MMOL/L (ref 22–29)
CREAT SERPL-MCNC: 0.88 MG/DL (ref 0.57–1)
DEPRECATED RDW RBC AUTO: 38.7 FL (ref 37–54)
EGFRCR SERPLBLD CKD-EPI 2021: 85.3 ML/MIN/1.73
EOSINOPHIL # BLD AUTO: 0.1 10*3/MM3 (ref 0–0.4)
EOSINOPHIL NFR BLD AUTO: 1.3 % (ref 0.3–6.2)
ERYTHROCYTE [DISTWIDTH] IN BLOOD BY AUTOMATED COUNT: 11.4 % (ref 12.3–15.4)
GLUCOSE SERPL-MCNC: 77 MG/DL (ref 65–99)
HCG SERPL QL: NEGATIVE
HCT VFR BLD AUTO: 43.2 % (ref 34–46.6)
HGB BLD-MCNC: 14.4 G/DL (ref 12–15.9)
IMM GRANULOCYTES # BLD AUTO: 0.03 10*3/MM3 (ref 0–0.05)
IMM GRANULOCYTES NFR BLD AUTO: 0.4 % (ref 0–0.5)
LYMPHOCYTES # BLD AUTO: 1.85 10*3/MM3 (ref 0.7–3.1)
LYMPHOCYTES NFR BLD AUTO: 24.5 % (ref 19.6–45.3)
MCH RBC QN AUTO: 31.2 PG (ref 26.6–33)
MCHC RBC AUTO-ENTMCNC: 33.3 G/DL (ref 31.5–35.7)
MCV RBC AUTO: 93.5 FL (ref 79–97)
MONOCYTES # BLD AUTO: 0.48 10*3/MM3 (ref 0.1–0.9)
MONOCYTES NFR BLD AUTO: 6.4 % (ref 5–12)
NEUTROPHILS NFR BLD AUTO: 5.05 10*3/MM3 (ref 1.7–7)
NEUTROPHILS NFR BLD AUTO: 67 % (ref 42.7–76)
NRBC BLD AUTO-RTO: 0 /100 WBC (ref 0–0.2)
PLATELET # BLD AUTO: 360 10*3/MM3 (ref 140–450)
PMV BLD AUTO: 9.2 FL (ref 6–12)
POTASSIUM SERPL-SCNC: 4.4 MMOL/L (ref 3.5–5.2)
RBC # BLD AUTO: 4.62 10*6/MM3 (ref 3.77–5.28)
RH BLD: POSITIVE
SODIUM SERPL-SCNC: 139 MMOL/L (ref 136–145)
T&S EXPIRATION DATE: NORMAL
WBC NRBC COR # BLD: 7.54 10*3/MM3 (ref 3.4–10.8)

## 2023-02-01 PROCEDURE — 86901 BLOOD TYPING SEROLOGIC RH(D): CPT | Performed by: OBSTETRICS & GYNECOLOGY

## 2023-02-01 PROCEDURE — 36415 COLL VENOUS BLD VENIPUNCTURE: CPT | Performed by: OBSTETRICS & GYNECOLOGY

## 2023-02-01 PROCEDURE — 86900 BLOOD TYPING SEROLOGIC ABO: CPT | Performed by: OBSTETRICS & GYNECOLOGY

## 2023-02-01 PROCEDURE — 86850 RBC ANTIBODY SCREEN: CPT | Performed by: OBSTETRICS & GYNECOLOGY

## 2023-02-01 PROCEDURE — 84703 CHORIONIC GONADOTROPIN ASSAY: CPT | Performed by: OBSTETRICS & GYNECOLOGY

## 2023-02-01 PROCEDURE — 80048 BASIC METABOLIC PNL TOTAL CA: CPT | Performed by: SURGERY

## 2023-02-01 PROCEDURE — 85025 COMPLETE CBC W/AUTO DIFF WBC: CPT | Performed by: OBSTETRICS & GYNECOLOGY

## 2023-02-01 RX ORDER — CHLORHEXIDINE GLUCONATE 500 MG/1
CLOTH TOPICAL
COMMUNITY
End: 2023-02-06 | Stop reason: HOSPADM

## 2023-02-01 RX ORDER — AMOXICILLIN 500 MG/1
1000 CAPSULE ORAL 2 TIMES DAILY
COMMUNITY
End: 2023-02-03

## 2023-02-01 NOTE — DISCHARGE INSTRUCTIONS
HOW DO I REST MY PELVIS?  For as long as told by your health care provider:  Do not have sex, sexual stimulation, or an orgasm.  Do not use tampons. Do not douche. Do not put anything in your vagina.  Avoid activities that take a lot of effort (are strenuous).  Avoid any activity in which your pelvic muscles could become strained.     Scopolamine Patch  This patch has been applied to the skin behind one of your ears.  It may stay in place up to 24 hours. You may remove it at any time after your surgery; however, it should be removed after you are up and walking around the next day.  This medicine reduces stomach upset. Side effects may include: dry mouth, dizziness, sleepiness, constipation, or upset stomach.  An allergy would show up as: a rash, itching, wheezing or shortness of breath.  Follow these instructions:  Do not drink alcohol, drive or operate machinery while taking this medicine.  Wear only 1 patch at a time. You can leave the patch on for up to 24 hours.  When you remove the patch, fold it in half with the sticky sides together and throw it away. Wash your hands and the area under the patch.  Do not touch your eye with your hand if it has touched the patch.  Wash your hands well before and after touching the patch.  Sit or stand slowly to avoid dizziness.  Call your doctor if you have:  Any sign of allergy  No relief  Trouble passing urine  Any new or severe symptoms

## 2023-02-01 NOTE — DISCHARGE INSTRUCTIONS
Take the following medications the morning of surgery:NONE    ARRIVAL TIME IS 0800 /06/2023      If you are on prescription narcotic pain medication to control your pain you may also take that medication the morning of surgery.    General Instructions:  Do not eat solid food after midnight the night before surgery.  You may drink clear liquids day of surgery but must stop at least one hour before your hospital arrival time.  It is beneficial for you to have a clear drink that contains carbohydrates the day of surgery.  We suggest a 12 to 20 ounce bottle of Gatorade or Powerade for non-diabetic patients or a 12 to 20 ounce bottle of G2 or Powerade Zero for diabetic patients. (Pediatric patients, are not advised to drink a 12 to 20 ounce carbohydrate drink)    Clear liquids are liquids you can see through.  Nothing red in color.     Plain water                               Sports drinks  Sodas                                   Gelatin (Jell-O)  Fruit juices without pulp such as white grape juice and apple juice  Popsicles that contain no fruit or yogurt  Tea or coffee (no cream or milk added)  Gatorade / Powerade  G2 / Powerade Zero      Patients who avoid smoking, chewing tobacco and alcohol for 4 weeks prior to surgery have a reduced risk of post-operative complications.  Quit smoking as many days before surgery as you can.  Do not smoke, use chewing tobacco or drink alcohol the day of surgery.   If applicable bring your C-PAP/ BI-PAP machine.  Bring any papers given to you in the doctor’s office.  Wear clean comfortable clothes.  Do not wear contact lenses, false eyelashes or make-up.  Bring a case for your glasses.   Bring crutches or walker if applicable.  Remove all piercings.  Leave jewelry and any other valuables at home.  Hair extensions with metal clips must be removed prior to surgery.  The Pre-Admission Testing nurse will instruct you to bring medications if unable to obtain an accurate list in  Pre-Admission Testing.        If you were given a blood bank ID arm band remember to bring it with you the day of surgery.    Preventing a Surgical Site Infection:  For 2 to 3 days before surgery, avoid shaving with a razor because the razor can irritate skin and make it easier to develop an infection.    Any areas of open skin can increase the risk of a post-operative wound infection by allowing bacteria to enter and travel throughout the body.  Notify your surgeon if you have any skin wounds / rashes even if it is not near the expected surgical site.  The area will need assessed to determine if surgery should be delayed until it is healed.  The night prior to surgery shower using a fresh bar of anti-bacterial soap (such as Dial) and clean washcloth.  Sleep in a clean bed with clean clothing.  Do not allow pets to sleep with you.  Shower on the morning of surgery using a fresh bar of anti-bacterial soap (such as Dial) and clean washcloth.  Dry with a clean towel and dress in clean clothing.  Ask your surgeon if you will be receiving antibiotics prior to surgery.  Make sure you, your family, and all healthcare providers clean their hands with soap and water or an alcohol based hand  before caring for you or your wound.      CHLORHEXIDINE CLOTH INSTRUCTIONS  The morning of surgery follow these instructions using the Chlorhexidine cloths you've been given.  These steps reduce bacteria on the body.  Do not use the cloths near your eyes, ears mouth, genitalia or on open wounds.  Throw the cloths away after use but do not try to flush them down a toilet.      Open and remove one cloth at a time from the package.    Leave the cloth unfolded and begin the bathing.  Massage the skin with the cloths using gentle pressure to remove bacteria.  Do not scrub harshly.   Follow the steps below with one 2% CHG cloth per area (6 total cloths).  One cloth for neck, shoulders and chest.  One cloth for both arms, hands,  fingers and underarms (do underarms last).  One cloth for the abdomen followed by groin.  One cloth for right leg and foot including between the toes.  One cloth for left leg and foot including between the toes.  The last cloth is to be used for the back of the neck, back and buttocks.    Allow the CHG to air dry 3 minutes on the skin which will give it time to work and decrease the chance of irritation.  The skin may feel sticky until it is dry.  Do not rinse with water or any other liquid or you will lose the beneficial effects of the CHG.  If mild skin irritation occurs, do rinse the skin to remove the CHG.  Report this to the nurse at time of admission.  Do not apply lotions, creams, ointments, deodorants or perfumes after using the clothes. Dress in clean clothes before coming to the hospital.       Day of surgery: ARRIVAL TIME IS 0800 ON 02/062023  Your arrival time is approximately two hours before your scheduled surgery time.  Upon arrival, a Pre-op nurse and Anesthesiologist will review your health history, obtain vital signs, and answer questions you may have.  The only belongings needed at this time will be a list of your home medications and if applicable your C-PAP/BI-PAP machine.  A Pre-op nurse will start an IV and you may receive medication in preparation for surgery, including something to help you relax.     Please be aware that surgery does come with discomfort.  We want to make every effort to control your discomfort so please discuss any uncontrolled symptoms with your nurse.   Your doctor will most likely have prescribed pain medications.      If you are going home after surgery you will receive individualized written care instructions before being discharged.  A responsible adult must drive you to and from the hospital on the day of your surgery and stay with you for 24 hours.  Discharge prescriptions can be filled by the hospital pharmacy during regular pharmacy hours.  If you are having  surgery late in the day/evening your prescription may be e-prescribed to your pharmacy.  Please verify your pharmacy hours or chose a 24 hour pharmacy to avoid not having access to your prescription because your pharmacy has closed for the day.    If you are staying overnight following surgery, you will be transported to your hospital room following the recovery period.  [facility] has all private rooms.    If you have any questions please call Pre-Admission Testing at [phone].  Deductibles and co-payments are collected on the day of service. Please be prepared to pay the required co-pay, deductible or deposit on the day of service as defined by your plan.    Call your surgeon immediately if you experience any of the following symptoms:  Sore Throat  Shortness of Breath or difficulty breathing  Cough  Chills  Body soreness or muscle pain  Headache  Fever  New loss of taste or smell  Do not arrive for your surgery ill.  Your procedure will need to be rescheduled to another time.  You will need to call your physician before the day of surgery to avoid any unnecessary exposure to hospital staff as well as other patients.

## 2023-02-06 ENCOUNTER — ANESTHESIA (OUTPATIENT)
Dept: PERIOP | Facility: HOSPITAL | Age: 43
End: 2023-02-06
Payer: COMMERCIAL

## 2023-02-06 ENCOUNTER — HOSPITAL ENCOUNTER (OUTPATIENT)
Facility: HOSPITAL | Age: 43
Discharge: HOME | End: 2023-02-06
Attending: OBSTETRICS & GYNECOLOGY | Admitting: OBSTETRICS & GYNECOLOGY
Payer: COMMERCIAL

## 2023-02-06 ENCOUNTER — ANESTHESIA EVENT (OUTPATIENT)
Dept: PERIOP | Facility: HOSPITAL | Age: 43
End: 2023-02-06
Payer: COMMERCIAL

## 2023-02-06 VITALS
SYSTOLIC BLOOD PRESSURE: 98 MMHG | WEIGHT: 127.87 LBS | TEMPERATURE: 97.9 F | DIASTOLIC BLOOD PRESSURE: 68 MMHG | OXYGEN SATURATION: 99 % | RESPIRATION RATE: 16 BRPM | HEIGHT: 62 IN | HEART RATE: 72 BPM | BODY MASS INDEX: 23.53 KG/M2

## 2023-02-06 DIAGNOSIS — R19.09 LUMP IN THE GROIN: ICD-10-CM

## 2023-02-06 DIAGNOSIS — K40.90 LEFT INGUINAL HERNIA: ICD-10-CM

## 2023-02-06 DIAGNOSIS — N92.0 MENORRHAGIA: Primary | ICD-10-CM

## 2023-02-06 PROCEDURE — 25010000002 GENTAMICIN PER 80 MG: Performed by: OBSTETRICS & GYNECOLOGY

## 2023-02-06 PROCEDURE — 25010000002 MIDAZOLAM PER 1 MG: Performed by: ANESTHESIOLOGY

## 2023-02-06 PROCEDURE — 49650 LAP ING HERNIA REPAIR INIT: CPT | Performed by: SPECIALIST/TECHNOLOGIST, OTHER

## 2023-02-06 PROCEDURE — 25010000002 NEOSTIGMINE 5 MG/10ML SOLUTION: Performed by: NURSE ANESTHETIST, CERTIFIED REGISTERED

## 2023-02-06 PROCEDURE — 88307 TISSUE EXAM BY PATHOLOGIST: CPT | Performed by: OBSTETRICS & GYNECOLOGY

## 2023-02-06 PROCEDURE — 25010000002 DEXAMETHASONE SODIUM PHOSPHATE 20 MG/5ML SOLUTION: Performed by: NURSE ANESTHETIST, CERTIFIED REGISTERED

## 2023-02-06 PROCEDURE — 49650 LAP ING HERNIA REPAIR INIT: CPT | Performed by: SURGERY

## 2023-02-06 PROCEDURE — C1781 MESH (IMPLANTABLE): HCPCS | Performed by: OBSTETRICS & GYNECOLOGY

## 2023-02-06 PROCEDURE — 25010000002 ONDANSETRON PER 1 MG: Performed by: NURSE ANESTHETIST, CERTIFIED REGISTERED

## 2023-02-06 PROCEDURE — 25010000002 FENTANYL CITRATE (PF) 50 MCG/ML SOLUTION: Performed by: NURSE ANESTHETIST, CERTIFIED REGISTERED

## 2023-02-06 PROCEDURE — 25010000002 KETOROLAC TROMETHAMINE PER 15 MG: Performed by: NURSE ANESTHETIST, CERTIFIED REGISTERED

## 2023-02-06 PROCEDURE — S0260 H&P FOR SURGERY: HCPCS | Performed by: SURGERY

## 2023-02-06 PROCEDURE — 25010000002 MIDAZOLAM PER 1 MG: Performed by: NURSE ANESTHETIST, CERTIFIED REGISTERED

## 2023-02-06 PROCEDURE — 25010000002 HYDROMORPHONE PER 4 MG: Performed by: NURSE ANESTHETIST, CERTIFIED REGISTERED

## 2023-02-06 PROCEDURE — 25010000002 PROPOFOL 10 MG/ML EMULSION: Performed by: NURSE ANESTHETIST, CERTIFIED REGISTERED

## 2023-02-06 PROCEDURE — 25010000002 ROPIVACAINE PER 1 MG: Performed by: OBSTETRICS & GYNECOLOGY

## 2023-02-06 PROCEDURE — 25010000002 HYDROMORPHONE 1 MG/ML SOLUTION: Performed by: NURSE ANESTHETIST, CERTIFIED REGISTERED

## 2023-02-06 PROCEDURE — 25010000002 MAGNESIUM SULFATE PER 500 MG OF MAGNESIUM: Performed by: NURSE ANESTHETIST, CERTIFIED REGISTERED

## 2023-02-06 DEVICE — MEDIUM-LARGE LIGATION CLIPS 6 CLIPS/CART
Type: IMPLANTABLE DEVICE | Site: ABDOMEN | Status: FUNCTIONAL
Brand: VAS-Q-CLIP

## 2023-02-06 DEVICE — IMPLANTABLE DEVICE: Type: IMPLANTABLE DEVICE | Site: INGUINAL | Status: FUNCTIONAL

## 2023-02-06 DEVICE — KNOTLESS TISSUE CONTROL DEVICE, UNDYED UNIDIRECTIONAL (ANTIBACTERIAL) SYNTHETIC ABSORBABLE DEVICE
Type: IMPLANTABLE DEVICE | Site: ABDOMEN | Status: FUNCTIONAL
Brand: STRATAFIX

## 2023-02-06 DEVICE — KNOTLESS TISSUE CONTROL DEVICE, VIOLET UNIDIRECTIONAL (ANTIBACTERIAL) SYNTHETIC ABSORBABLE DEVICE
Type: IMPLANTABLE DEVICE | Site: ABDOMEN | Status: FUNCTIONAL
Brand: STRATAFIX

## 2023-02-06 RX ORDER — FAMOTIDINE 10 MG/ML
20 INJECTION, SOLUTION INTRAVENOUS ONCE
Status: COMPLETED | OUTPATIENT
Start: 2023-02-06 | End: 2023-02-06

## 2023-02-06 RX ORDER — SODIUM CHLORIDE 9 MG/ML
40 INJECTION, SOLUTION INTRAVENOUS AS NEEDED
Status: DISCONTINUED | OUTPATIENT
Start: 2023-02-06 | End: 2023-02-06 | Stop reason: HOSPADM

## 2023-02-06 RX ORDER — GLYCOPYRROLATE 0.2 MG/ML
INJECTION INTRAMUSCULAR; INTRAVENOUS AS NEEDED
Status: DISCONTINUED | OUTPATIENT
Start: 2023-02-06 | End: 2023-02-06 | Stop reason: SURG

## 2023-02-06 RX ORDER — EPHEDRINE SULFATE 50 MG/ML
5 INJECTION, SOLUTION INTRAVENOUS ONCE AS NEEDED
Status: DISCONTINUED | OUTPATIENT
Start: 2023-02-06 | End: 2023-02-06 | Stop reason: HOSPADM

## 2023-02-06 RX ORDER — CLINDAMYCIN PHOSPHATE 900 MG/50ML
900 INJECTION INTRAVENOUS ONCE
Status: COMPLETED | OUTPATIENT
Start: 2023-02-06 | End: 2023-02-06

## 2023-02-06 RX ORDER — SODIUM CHLORIDE 0.9 % (FLUSH) 0.9 %
10 SYRINGE (ML) INJECTION EVERY 12 HOURS SCHEDULED
Status: DISCONTINUED | OUTPATIENT
Start: 2023-02-06 | End: 2023-02-06 | Stop reason: HOSPADM

## 2023-02-06 RX ORDER — HYDROMORPHONE HYDROCHLORIDE 1 MG/ML
0.5 INJECTION, SOLUTION INTRAMUSCULAR; INTRAVENOUS; SUBCUTANEOUS
Status: DISCONTINUED | OUTPATIENT
Start: 2023-02-06 | End: 2023-02-06 | Stop reason: HOSPADM

## 2023-02-06 RX ORDER — GABAPENTIN 100 MG/1
100 CAPSULE ORAL 3 TIMES DAILY
Qty: 6 CAPSULE | Refills: 0 | Status: SHIPPED | OUTPATIENT
Start: 2023-02-06 | End: 2023-02-08

## 2023-02-06 RX ORDER — ROCURONIUM BROMIDE 10 MG/ML
INJECTION, SOLUTION INTRAVENOUS AS NEEDED
Status: DISCONTINUED | OUTPATIENT
Start: 2023-02-06 | End: 2023-02-06 | Stop reason: SURG

## 2023-02-06 RX ORDER — ROPIVACAINE HYDROCHLORIDE 5 MG/ML
INJECTION, SOLUTION EPIDURAL; INFILTRATION; PERINEURAL AS NEEDED
Status: DISCONTINUED | OUTPATIENT
Start: 2023-02-06 | End: 2023-02-06 | Stop reason: HOSPADM

## 2023-02-06 RX ORDER — NEOSTIGMINE METHYLSULFATE 0.5 MG/ML
INJECTION, SOLUTION INTRAVENOUS AS NEEDED
Status: DISCONTINUED | OUTPATIENT
Start: 2023-02-06 | End: 2023-02-06 | Stop reason: SURG

## 2023-02-06 RX ORDER — ONDANSETRON 2 MG/ML
4 INJECTION INTRAMUSCULAR; INTRAVENOUS ONCE AS NEEDED
Status: DISCONTINUED | OUTPATIENT
Start: 2023-02-06 | End: 2023-02-06 | Stop reason: HOSPADM

## 2023-02-06 RX ORDER — FENTANYL CITRATE 50 UG/ML
50 INJECTION, SOLUTION INTRAMUSCULAR; INTRAVENOUS
Status: DISCONTINUED | OUTPATIENT
Start: 2023-02-06 | End: 2023-02-06 | Stop reason: HOSPADM

## 2023-02-06 RX ORDER — CEFAZOLIN SODIUM 2 G/100ML
2 INJECTION, SOLUTION INTRAVENOUS ONCE
Status: DISCONTINUED | OUTPATIENT
Start: 2023-02-06 | End: 2023-02-06

## 2023-02-06 RX ORDER — LABETALOL HYDROCHLORIDE 5 MG/ML
5 INJECTION, SOLUTION INTRAVENOUS
Status: DISCONTINUED | OUTPATIENT
Start: 2023-02-06 | End: 2023-02-06 | Stop reason: HOSPADM

## 2023-02-06 RX ORDER — IBUPROFEN 600 MG/1
600 TABLET ORAL EVERY 6 HOURS
Qty: 28 TABLET | Refills: 0 | Status: SHIPPED | OUTPATIENT
Start: 2023-02-06 | End: 2023-02-13

## 2023-02-06 RX ORDER — OXYCODONE HYDROCHLORIDE 5 MG/1
5 TABLET ORAL EVERY 6 HOURS PRN
Qty: 25 TABLET | Refills: 0 | Status: SHIPPED | OUTPATIENT
Start: 2023-02-06 | End: 2023-02-13

## 2023-02-06 RX ORDER — NALOXONE HCL 0.4 MG/ML
0.2 VIAL (ML) INJECTION AS NEEDED
Status: DISCONTINUED | OUTPATIENT
Start: 2023-02-06 | End: 2023-02-06 | Stop reason: HOSPADM

## 2023-02-06 RX ORDER — FLUMAZENIL 0.1 MG/ML
0.2 INJECTION INTRAVENOUS AS NEEDED
Status: DISCONTINUED | OUTPATIENT
Start: 2023-02-06 | End: 2023-02-06 | Stop reason: HOSPADM

## 2023-02-06 RX ORDER — PROMETHAZINE HYDROCHLORIDE 25 MG/1
25 TABLET ORAL ONCE AS NEEDED
Status: DISCONTINUED | OUTPATIENT
Start: 2023-02-06 | End: 2023-02-06 | Stop reason: HOSPADM

## 2023-02-06 RX ORDER — CELECOXIB 200 MG/1
200 CAPSULE ORAL ONCE
Status: COMPLETED | OUTPATIENT
Start: 2023-02-06 | End: 2023-02-06

## 2023-02-06 RX ORDER — OXYCODONE HYDROCHLORIDE 5 MG/1
5 TABLET ORAL EVERY 6 HOURS PRN
Status: DISCONTINUED | OUTPATIENT
Start: 2023-02-06 | End: 2023-02-06 | Stop reason: HOSPADM

## 2023-02-06 RX ORDER — PHENYLEPHRINE HCL IN 0.9% NACL 1 MG/10 ML
SYRINGE (ML) INTRAVENOUS AS NEEDED
Status: DISCONTINUED | OUTPATIENT
Start: 2023-02-06 | End: 2023-02-06 | Stop reason: SURG

## 2023-02-06 RX ORDER — MIDAZOLAM HYDROCHLORIDE 1 MG/ML
1 INJECTION INTRAMUSCULAR; INTRAVENOUS
Status: COMPLETED | OUTPATIENT
Start: 2023-02-06 | End: 2023-02-06

## 2023-02-06 RX ORDER — DIPHENHYDRAMINE HYDROCHLORIDE 50 MG/ML
12.5 INJECTION INTRAMUSCULAR; INTRAVENOUS
Status: DISCONTINUED | OUTPATIENT
Start: 2023-02-06 | End: 2023-02-06 | Stop reason: HOSPADM

## 2023-02-06 RX ORDER — SODIUM CHLORIDE, SODIUM LACTATE, POTASSIUM CHLORIDE, CALCIUM CHLORIDE 600; 310; 30; 20 MG/100ML; MG/100ML; MG/100ML; MG/100ML
INJECTION, SOLUTION INTRAVENOUS CONTINUOUS PRN
Status: DISCONTINUED | OUTPATIENT
Start: 2023-02-06 | End: 2023-02-06 | Stop reason: SURG

## 2023-02-06 RX ORDER — SCOLOPAMINE TRANSDERMAL SYSTEM 1 MG/1
1 PATCH, EXTENDED RELEASE TRANSDERMAL ONCE
Status: DISCONTINUED | OUTPATIENT
Start: 2023-02-06 | End: 2023-02-06 | Stop reason: HOSPADM

## 2023-02-06 RX ORDER — HYDRALAZINE HYDROCHLORIDE 20 MG/ML
5 INJECTION INTRAMUSCULAR; INTRAVENOUS
Status: DISCONTINUED | OUTPATIENT
Start: 2023-02-06 | End: 2023-02-06 | Stop reason: HOSPADM

## 2023-02-06 RX ORDER — SODIUM CHLORIDE 0.9 % (FLUSH) 0.9 %
10 SYRINGE (ML) INJECTION AS NEEDED
Status: DISCONTINUED | OUTPATIENT
Start: 2023-02-06 | End: 2023-02-06 | Stop reason: HOSPADM

## 2023-02-06 RX ORDER — MIDAZOLAM HYDROCHLORIDE 1 MG/ML
INJECTION INTRAMUSCULAR; INTRAVENOUS AS NEEDED
Status: DISCONTINUED | OUTPATIENT
Start: 2023-02-06 | End: 2023-02-06 | Stop reason: SURG

## 2023-02-06 RX ORDER — ACETAMINOPHEN 325 MG/1
650 TABLET ORAL EVERY 6 HOURS
Qty: 56 TABLET | Refills: 0 | Status: SHIPPED | OUTPATIENT
Start: 2023-02-06 | End: 2023-02-13

## 2023-02-06 RX ORDER — MAGNESIUM SULFATE HEPTAHYDRATE 500 MG/ML
INJECTION, SOLUTION INTRAMUSCULAR; INTRAVENOUS AS NEEDED
Status: DISCONTINUED | OUTPATIENT
Start: 2023-02-06 | End: 2023-02-06 | Stop reason: SURG

## 2023-02-06 RX ORDER — LIDOCAINE HYDROCHLORIDE 20 MG/ML
INJECTION, SOLUTION INFILTRATION; PERINEURAL AS NEEDED
Status: DISCONTINUED | OUTPATIENT
Start: 2023-02-06 | End: 2023-02-06 | Stop reason: SURG

## 2023-02-06 RX ORDER — SODIUM CHLORIDE, SODIUM LACTATE, POTASSIUM CHLORIDE, CALCIUM CHLORIDE 600; 310; 30; 20 MG/100ML; MG/100ML; MG/100ML; MG/100ML
9 INJECTION, SOLUTION INTRAVENOUS CONTINUOUS
Status: DISCONTINUED | OUTPATIENT
Start: 2023-02-06 | End: 2023-02-06 | Stop reason: HOSPADM

## 2023-02-06 RX ORDER — GABAPENTIN 100 MG/1
100 CAPSULE ORAL ONCE
Status: COMPLETED | OUTPATIENT
Start: 2023-02-06 | End: 2023-02-06

## 2023-02-06 RX ORDER — KETOROLAC TROMETHAMINE 30 MG/ML
30 INJECTION, SOLUTION INTRAMUSCULAR; INTRAVENOUS ONCE
Status: COMPLETED | OUTPATIENT
Start: 2023-02-06 | End: 2023-02-06

## 2023-02-06 RX ORDER — OXYCODONE AND ACETAMINOPHEN 7.5; 325 MG/1; MG/1
1 TABLET ORAL EVERY 4 HOURS PRN
Status: DISCONTINUED | OUTPATIENT
Start: 2023-02-06 | End: 2023-02-06 | Stop reason: HOSPADM

## 2023-02-06 RX ORDER — PROPOFOL 10 MG/ML
VIAL (ML) INTRAVENOUS AS NEEDED
Status: DISCONTINUED | OUTPATIENT
Start: 2023-02-06 | End: 2023-02-06 | Stop reason: SURG

## 2023-02-06 RX ORDER — PROMETHAZINE HYDROCHLORIDE 25 MG/1
25 SUPPOSITORY RECTAL ONCE AS NEEDED
Status: DISCONTINUED | OUTPATIENT
Start: 2023-02-06 | End: 2023-02-06 | Stop reason: HOSPADM

## 2023-02-06 RX ORDER — SODIUM CHLORIDE 9 MG/ML
INJECTION, SOLUTION INTRAVENOUS AS NEEDED
Status: DISCONTINUED | OUTPATIENT
Start: 2023-02-06 | End: 2023-02-06 | Stop reason: HOSPADM

## 2023-02-06 RX ORDER — ONDANSETRON 2 MG/ML
INJECTION INTRAMUSCULAR; INTRAVENOUS AS NEEDED
Status: DISCONTINUED | OUTPATIENT
Start: 2023-02-06 | End: 2023-02-06 | Stop reason: SURG

## 2023-02-06 RX ORDER — HYDROCODONE BITARTRATE AND ACETAMINOPHEN 7.5; 325 MG/1; MG/1
1 TABLET ORAL ONCE AS NEEDED
Status: DISCONTINUED | OUTPATIENT
Start: 2023-02-06 | End: 2023-02-06 | Stop reason: HOSPADM

## 2023-02-06 RX ORDER — DEXAMETHASONE SODIUM PHOSPHATE 4 MG/ML
INJECTION, SOLUTION INTRA-ARTICULAR; INTRALESIONAL; INTRAMUSCULAR; INTRAVENOUS; SOFT TISSUE AS NEEDED
Status: DISCONTINUED | OUTPATIENT
Start: 2023-02-06 | End: 2023-02-06 | Stop reason: SURG

## 2023-02-06 RX ORDER — DIPHENHYDRAMINE HCL 25 MG
25 CAPSULE ORAL
Status: DISCONTINUED | OUTPATIENT
Start: 2023-02-06 | End: 2023-02-06 | Stop reason: HOSPADM

## 2023-02-06 RX ADMIN — SODIUM CHLORIDE, POTASSIUM CHLORIDE, SODIUM LACTATE AND CALCIUM CHLORIDE 9 ML/HR: 600; 310; 30; 20 INJECTION, SOLUTION INTRAVENOUS at 09:44

## 2023-02-06 RX ADMIN — HYDROMORPHONE HYDROCHLORIDE 0.5 MG: 1 INJECTION, SOLUTION INTRAMUSCULAR; INTRAVENOUS; SUBCUTANEOUS at 11:27

## 2023-02-06 RX ADMIN — NEOSTIGMINE METHYLSULFATE 3 MG: 0.5 INJECTION INTRAVENOUS at 12:20

## 2023-02-06 RX ADMIN — Medication 100 MCG: at 11:53

## 2023-02-06 RX ADMIN — CLINDAMYCIN IN 5 PERCENT DEXTROSE 900 MG: 18 INJECTION, SOLUTION INTRAVENOUS at 10:34

## 2023-02-06 RX ADMIN — PROPOFOL 200 MG: 10 INJECTION, EMULSION INTRAVENOUS at 10:37

## 2023-02-06 RX ADMIN — GABAPENTIN 100 MG: 100 CAPSULE ORAL at 14:12

## 2023-02-06 RX ADMIN — HYDROMORPHONE HYDROCHLORIDE 0.5 MG: 1 INJECTION, SOLUTION INTRAMUSCULAR; INTRAVENOUS; SUBCUTANEOUS at 12:27

## 2023-02-06 RX ADMIN — MIDAZOLAM 2 MG: 1 INJECTION INTRAMUSCULAR; INTRAVENOUS at 10:27

## 2023-02-06 RX ADMIN — FENTANYL CITRATE 50 MCG: 50 INJECTION, SOLUTION INTRAMUSCULAR; INTRAVENOUS at 13:26

## 2023-02-06 RX ADMIN — ONDANSETRON 4 MG: 2 INJECTION INTRAMUSCULAR; INTRAVENOUS at 12:14

## 2023-02-06 RX ADMIN — HYDROMORPHONE HYDROCHLORIDE 0.5 MG: 1 INJECTION, SOLUTION INTRAMUSCULAR; INTRAVENOUS; SUBCUTANEOUS at 13:27

## 2023-02-06 RX ADMIN — SODIUM CHLORIDE, POTASSIUM CHLORIDE, SODIUM LACTATE AND CALCIUM CHLORIDE: 600; 310; 30; 20 INJECTION, SOLUTION INTRAVENOUS at 10:24

## 2023-02-06 RX ADMIN — Medication 100 MCG: at 11:57

## 2023-02-06 RX ADMIN — SCOPALAMINE 1 PATCH: 1 PATCH, EXTENDED RELEASE TRANSDERMAL at 08:53

## 2023-02-06 RX ADMIN — GLYCOPYRROLATE 0.3 MG: 1 INJECTION INTRAMUSCULAR; INTRAVENOUS at 10:55

## 2023-02-06 RX ADMIN — MIDAZOLAM 1 MG: 1 INJECTION INTRAMUSCULAR; INTRAVENOUS at 08:54

## 2023-02-06 RX ADMIN — KETOROLAC TROMETHAMINE 30 MG: 30 INJECTION, SOLUTION INTRAMUSCULAR; INTRAVENOUS at 12:54

## 2023-02-06 RX ADMIN — MIDAZOLAM 1 MG: 1 INJECTION INTRAMUSCULAR; INTRAVENOUS at 09:44

## 2023-02-06 RX ADMIN — DEXAMETHASONE SODIUM PHOSPHATE 8 MG: 4 INJECTION, SOLUTION INTRAMUSCULAR; INTRAVENOUS at 10:45

## 2023-02-06 RX ADMIN — ROCURONIUM BROMIDE 50 MG: 10 INJECTION INTRAVENOUS at 10:37

## 2023-02-06 RX ADMIN — GLYCOPYRROLATE 0.4 MG: 1 INJECTION INTRAMUSCULAR; INTRAVENOUS at 12:20

## 2023-02-06 RX ADMIN — MAGNESIUM SULFATE HEPTAHYDRATE 2 G: 500 INJECTION, SOLUTION INTRAMUSCULAR; INTRAVENOUS at 10:43

## 2023-02-06 RX ADMIN — GENTAMICIN SULFATE 250 MG: 40 INJECTION, SOLUTION INTRAMUSCULAR; INTRAVENOUS at 10:43

## 2023-02-06 RX ADMIN — LIDOCAINE HYDROCHLORIDE 100 MG: 20 INJECTION, SOLUTION INFILTRATION; PERINEURAL at 10:37

## 2023-02-06 RX ADMIN — FAMOTIDINE 20 MG: 10 INJECTION INTRAVENOUS at 08:53

## 2023-02-06 RX ADMIN — OXYCODONE 5 MG: 5 TABLET ORAL at 14:12

## 2023-02-06 RX ADMIN — CELECOXIB 200 MG: 200 CAPSULE ORAL at 10:18

## 2023-02-06 NOTE — OP NOTE
Subjective     Patient Name: Lilia Lea  :  1980  MRN:  [id number]        Date of Service:  23    Surgeon:  Assistant: MD Emily Connors MD         Pre-operative diagnosis(es):   Menorrhagia  Adenomyosis    Ventral hernia without obstruction or gangrene       Post-operative diagnosis(es):   Menorrhagia  Adenomyosis    Ventral hernia without obstruction or gangrene         Procedure(s): Procedure(s):  DA RAYMOND ROBOT TOTAL LAPAROSCOPIC HYSTERECTOMY BILATERAL SALPINGECTOMY  Laparoscopic robotic assisted left inguinal hernia repair with mesh, possible bilateral           Anesthesia: Type: General       Objective          Specimens removed: Uterus and cervix and bilateral fallopian tubes        EBL: 25  Ml (for hysterectomy)     Antibiotics:                clindamycin (Cleocin) and gentamycin (Garamycin) ordered on call to OR               Complications:      Assessment & Plan  None       Operative Findings: Normal uterus, normal ovaries bilaterally and fallopian tubes with evidence of prior tubal ligation     Indication for surgery:  with worsening menorrhagia and dysmenorrhea. She had undergone conservative treatment with endometrial ablation approximately 10 yrs ago but over time her symptoms returned and progressively worsened. Her U/S showed evidence of adenomyosis.                    Description of Procedure: The patient was taken operating room where general endotracheal anesthesia was obtained without difficulty.  She is then prepped and draped in the dorsal lithotomy position with the traditional robotic modifications.  A Etienne catheter was placed within the bladder.  A speculum was placed within the vagina and the anterior lip of the cervix was grasped with a single-tooth tenaculum.  The cervix was gently dilated and sounded to 8 cm.  The 8 cm Maryam manipulator stem was attached to the handle and the small Koh ring was utilized.  The stem was advanced through the  cervix into the uterine cavity and the ring was firmly positioned around the cervix. The intracavitary balloon was filled with 5 cc of saline.  All instrument are then removed from the patient's vagina.  Surgeon's gloves were changed and attention was turned to the abdomen.     The infraumbilical skin was injected with 4 cc of 0.5% Naropin and a small skin incision was made.  The varies needle was inserted and intra-abdominal placement was confirmed by noting a low opening pressure with initial insufflation of CO2.  3 L pneumoperitoneum was obtained and then the varies needle was removed.  A da Marcella X I 8 mm trocar was placed under direct visualization. 2 additional da Marcella X I 8 mm trocars were placed 8 cm lateral and 1 cm inferior to the midline.  Skin sites were first injected with 0.5% Naropin the skin was then incised and the trocars were placed under direct visualization.  An assistant trocar site was then placed in the right upper quadrant.  This was a 10 mm bladeless Optiview trocar and was placed in a similar fashion to the other trocars.  Survey of the patient's pelvis was undertaken with findings as noted above. The DaVinci XI was then deployed and positioned appropriately. The da Marcella X I robot #3 arm was then docked to the midline camera port.  The 0 degree da Marcella endoscope was placed within in this trocar and the pelvis was targeted.  This then deployed the remaining arms for docking.  The #1 arm was stowed.  The #2 arm was docked to the left side trocar and the #4 arm to the right sided trocar.  The hook cautery was placed within the #4 arm and the vessel sealer placed within the #2 arm.  At this time I broke scrub to go to the surgeon's console.      Attention was first turned to the right side of the pelvis.  The assistant grasped the fimbriated remnant of fallopian tube and elevated it.  I then used the vessel sealer to cauterize the mesosalpinx and transect the tube. It was removed from the  abdomen. The round ligament was then cauterized and cut using the vessel sealer with good hemostasis noted.  The utero-ovarian ligament was cauterized and transected using the vessel sealer again with good hemostasis noted.  The hook was used to open the anterior leaf of the board ligament working down toward the ZEUS. The uterine vein and artery were cauterized and transected with the vessel sealer with good hemostasis noted. Attention was then turned to the left side of the pelvis.  The assistant grasped the fimbriated remnant of fallopian tube and elevated it. The vessel sealer to cauterize the mesosalpinx and transect the fallopian tube. It was removed from the abdomen.  The round ligament was then cauterized and cut using the vessel sealer with good hemostasis noted.  The utero-ovarian ligament was cauterized and transected using the vessel sealer again with good hemostasis noted.  The vessel sealer was then used to take the uterine arteries sealing them well and then transecting them after confirmation of a good seal. Attention was then turned to the lower uterine segment where the bladder was dissected off of the lower uterine segment and cervix using gentle scoring sharp with the monopolar dian and some blunt dissection pushing the bladder down below the level of the ring where the anterior colpotomy would need to be made.  Additional tissue bites were then taken bilaterally with the vessel sealer which perform cauterization and cutting.  The tissue was scored along the Koh ring using the hook cautery making the anterior colpotomy and extending this laterally on both sides.  The uterus was then anteverted and the posterior serosa was scored using the hook and then carried deeper until the blue of the Koh ring was identified opening the posterior colpotomy.  This dissection was then continued laterally on both sides meeting the anterior colpotomy which then freed the cervix from its attachment to the vagina.    The uterus with the Maryam stem was removed from the vagina.  The occluder was replaced within the vagina. The pelvis was copiously irrigated and operative sites were noted to be hemostatic. The hook cautery was removed and replaced with large da Marcella needle .  2-0 barbed suture was then placed by the assistant into the pelvis.  Cuff closure began at the right corner and was closed in a running fashion towards the midline. The suture taken back upon itself 2 times and then cut and removed from the abdomen.  A second suture was then placed in the abdomen and closure the cuff began in the left corner.  The cuff was closed in a running fashion towards the midline and then back upon itself ×2 cut and removed from the abdomen.  The abdomen was again copiously irrigated and reinspected and the operative sites were noted to be hemostatic.  The pneumoperitoneum was allowed to escape so that the pressure would decrease it to single digits and again all operative sites were hemostatic. The robotic instruments removed from the abdomen.  The robot was undocked and trocars removed after the entire pneumoperitoneum was allowed to escape.  re-scrubbed and returned to the abdomen.  The Etienne catheter was removed and the bladder was filled retrograde with 200 cc of sterile water.  The 5 mm laparoscope was placed within the bladder to perform cystoscopy.  The dome of the bladder was noted to be intact and urine was noted to be reflux from both ureteral orifice.  The laparoscope was removed and the Etienne catheter was replaced.  The laparoscope was removed and the Etienne catheter was replaced.  Sponge lap and needle counts are correct ×2.     At this time Dr. Ellis scrubbed to perform her portion of the case.                                Gerber Juarez MD  2/6/2023  12:11 EST

## 2023-02-06 NOTE — ANESTHESIA PROCEDURE NOTES
Airway  Urgency: elective    Date/Time: 2/6/2023 10:39 AM  Airway not difficult    General Information and Staff    Patient location during procedure: OR  Anesthesiologist: Carol Del Angel MD  CRNA/CAA: Audelia Cronin CRNA    Indications and Patient Condition  Indications for airway management: airway protection    Preoxygenated: yes  MILS not maintained throughout  Mask difficulty assessment: 1 - vent by mask    Final Airway Details  Final airway type: endotracheal airway      Successful airway: ETT  Cuffed: yes   Successful intubation technique: direct laryngoscopy  Endotracheal tube insertion site: oral  Blade: Celina  Blade size: 3  ETT size (mm): 7.0  Cormack-Lehane Classification: grade I - full view of glottis  Placement verified by: chest auscultation and capnometry   Cuff volume (mL): 7  Measured from: lips  ETT/EBT  to lips (cm): 21  Number of attempts at approach: 1  Assessment: lips, teeth, and gum same as pre-op and atraumatic intubation    Additional Comments  Pt preoxygenated prior to induction, easy mask airway, atraumatic intubation,+ ETCO2, + bs bilat,  ETT secured and connected to ventilator.

## 2023-02-06 NOTE — ANESTHESIA PREPROCEDURE EVALUATION
Anesthesia Evaluation     no history of anesthetic complications:               Airway   Mallampati: I  TM distance: >3 FB  Neck ROM: full  Dental      Comment: Capped upper front tooth    Pulmonary    (-) shortness of breath, recent URI, not a smoker    ROS comment: Nasal congestion chronic  Cardiovascular     (-) hypertension, valvular problems/murmurs, hyperlipidemia      Neuro/Psych  GI/Hepatic/Renal/Endo    (-) hepatitis, liver disease, no renal disease, diabetes    Musculoskeletal     Abdominal    Substance History      OB/GYN          Other                        Anesthesia Plan    ASA 1     intravenous induction     Anesthetic plan, risks, benefits, and alternatives have been provided, discussed and informed consent has been obtained with: patient.        CODE STATUS:

## 2023-02-06 NOTE — H&P
"/79 (BP Location: Right arm, Patient Position: Lying)   Pulse 68   Temp 97.8 °F (36.6 °C) (Oral)   Resp 16   Ht 157.5 cm (62\")   Wt 58 kg (127 lb 13.9 oz)   LMP 01/16/2023   SpO2 98%   BMI 23.39 kg/m²       Results from last 7 days   Lab Units 02/01/23  1125   WBC 10*3/mm3 7.54   HEMOGLOBIN g/dL 14.4   HEMATOCRIT % 43.2   PLATELETS 10*3/mm3 360     H&P reviewed. The patient was examined and there are no changes to the H&P.    Gerber Juarez MD  2/6/2023  10:11 EST      "

## 2023-02-06 NOTE — ANESTHESIA POSTPROCEDURE EVALUATION
Patient: Lilia Lea    Procedure Summary     Date: 02/06/23 Room / Location: [facility] OR  / [facility] MAIN OR    Anesthesia Start: 1024 Anesthesia Stop: 1246    Procedures:       DA RAYMOND ROBOT TOTAL LAPAROSCOPIC HYSTERECTOMY BILATERAL SALPINGECTOMY (Bilateral: Abdomen)      Laparoscopic robotic assisted left inguinal hernia repair with mesh (Left: Abdomen) Diagnosis:     Surgeons: Gerber Juarez MD; Shahida Ellis MD Provider: Carol Del Angel MD    Anesthesia Type: Not recorded ASA Status: 1          Anesthesia Type: No value filed.    Vitals  Vitals Value Taken Time   /73 02/06/23 1406   Temp 36.6 °C (97.9 °F) 02/06/23 1415   Pulse 84 02/06/23 1418   Resp 16 02/06/23 1405   SpO2 100 % 02/06/23 1418   Vitals shown include unvalidated device data.        Post Anesthesia Care and Evaluation    Patient location during evaluation: PACU  Patient participation: complete - patient participated  Level of consciousness: awake and alert  Pain management: adequate    Airway patency: patent  Anesthetic complications: No anesthetic complications    Cardiovascular status: acceptable  Respiratory status: acceptable  Hydration status: acceptable    Comments: --------------------            02/06/23               1445     --------------------   BP:       105/66     Pulse:      67       Resp:       16       Temp:                SpO2:      99%      --------------------
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
## 2023-02-06 NOTE — OP NOTE
Operative Note :  Shahida Ellis MD      Lilia Lea  1980    Procedure Date: 02/06/23    Pre-op Diagnosis:  Left inguinal hernia, reducible    Post-Operative Diagnosis:  Left inguinal hernia, reducible    Procedure:   Laparoscopic robotic assisted left inguinal hernia repair with mesh    Surgeon: Shahida Ellis MD    Assistant: Toan Russo CSA (Toan was responsible for suctioning, retracting, suturing of all surgical incisions, and application of sterile dressings at the completion of the case)    Anesthesia:  General (general endotracheal tube)    Estimated Blood Loss: minimal    Specimens: None    Complications: None    Indications:  The patient is a 42-year-old lady who was scheduled to undergo a robotic hysterectomy today and came to see me in November of last year with a palpable firm bulge in the left groin that ultrasound confirmed to be a fat-containing inguinal hernia.  I have recommended performing a robotic inguinal hernia repair today at the same time as her hysterectomy.  She understands the risks of my portion of the procedure include bleeding, wound infection, mesh complications such as erosion into the bowel, hernia recurrence, and possible damage to pelvic floor structures such as the ureter or iliac vessels.  Despite these risks, she has consented to proceed    Findings: Tiny fat-containing left inguinal hernia    Description of procedure:  The patient was already intubated under general anesthesia in lithotomy position having just undergone a robotic hysterectomy with Dr. Juarez.  I was called into the room and upon performing mild surgical timeout I then inserted the robotic camera followed by robotic instruments into the periumbilical 8 mm trocars that had already been placed by Dr. Juarez.  I inspected the pelvic floor and at first there appeared to be no evidence for an inguinal hernia.  I went to the robotic console to begin the dissection and started near the left  lateral umbilical ligament incising the peritoneum sharply.  The peritoneal flap was then dissected laterally towards the anterior superior leg spine and swept off of the undersurface of the rectus abdominis muscles.  In doing so, I unroofed a fat-containing left inguinal hernia with incarcerated preperitoneal fat.  This was completely reduced and left a small pelvic floor fascial defect consistent with a left-sided inguinal hernia.  The remainder of the preperitoneal space was developed to expose Roberto's ligament medially and a large mid weight 3D max polypropylene mesh was inserted into the preperitoneal space.  It was secured to Roberto's ligament with a 2-0 Vicryl suture and secured along the anterior abdominal wall in 2 locations both medial and lateral to the inferior epigastric vessels also with 2-0 Vicryl sutures.  The peritoneum was then brought back up over the mesh and secured to itself using running 2-0 Monocryl barbed strata fix suture.  There were no peritoneal defects requiring closure.  All needles were then removed from the abdominal cavity and accounted for on the back table.  All trocars were then removed and the abdomen was desufflated.  The fascia at each trocar site including the 11 mm assist port were closed primarily with 0 Vicryl sutures and skin closed at all 4 incisions using 4-0 Monocryl with topical Exofin glue.  She was then extubated, her Etienne catheter removed, and she transferred to PACU in stable condition with all counts correct per nursing.    Shahida Ellis MD  General, Robotic, and Endoscopic Surgery  [facility]    [address], [address]  [place] [address]  P: [phone]  F: [phone]

## 2023-02-07 LAB
LAB AP CASE REPORT: NORMAL
PATH REPORT.FINAL DX SPEC: NORMAL
PATH REPORT.GROSS SPEC: NORMAL

## 2023-02-28 ENCOUNTER — OFFICE VISIT (OUTPATIENT)
Dept: SURGERY | Facility: CLINIC | Age: 43
End: 2023-02-28
Payer: COMMERCIAL

## 2023-02-28 DIAGNOSIS — Z09 SURGICAL FOLLOWUP: Primary | ICD-10-CM

## 2023-02-28 DIAGNOSIS — K40.90 LEFT INGUINAL HERNIA: ICD-10-CM

## 2023-02-28 PROCEDURE — 99024 POSTOP FOLLOW-UP VISIT: CPT | Performed by: SURGERY

## 2023-02-28 NOTE — PROGRESS NOTES
CHIEF COMPLAINT:   Chief Complaint   Patient presents with   • Post-op     Laparoscopic robotic assisted left inguinal hernia repair with mesh 2/6/23       HISTORY OF PRESENT ILLNESS:  This is a 42 y.o. female who presents for a post-operative visit after undergoing laparoscopic robotic assisted left inguinal hernia repair with mesh on 2/6/2023 in conjunction with a hysterectomy performed by Dr. Abiola Juarez.  She has been doing well with no fever, chills, or urinary retention.  Her only urinary issue is a sensation of lower pelvic pressure towards the end of voiding, that I explained is likely related to her hysterectomy and vaginal cuff healing.    PHYSICAL EXAM:  Lungs: Clear  Heart: RRR  ABD: Incisions are healing well without any erythema or signs of infection.  Ext: no significant edema, calves nontender    A/P:  This is a 42 y.o. female patient who is S/P laparoscopic robotic assisted left inguinal hernia repair with mesh on 2/6/2023    She is healing beautifully.  From my perspective she can go back to all activities as tolerated but she still has certain restrictions in place due to the hysterectomy performed at the same time.  Once she has been cleared to return to all activities by Dr. Juarez she can return to exercising, heavy lifting, etc.  She expressed understanding and will see me back as needed.    Shahida Ellis MD  General, Robotic, and Endoscopic Surgery  [facility]    [address], [address]  [place] [address]  P: [phone]  F: [phone]

## 2023-04-26 ENCOUNTER — OFFICE VISIT (OUTPATIENT)
Dept: GASTROENTEROLOGY | Facility: CLINIC | Age: 43
End: 2023-04-26
Payer: COMMERCIAL

## 2023-04-26 VITALS
WEIGHT: 127.4 LBS | HEIGHT: 63 IN | BODY MASS INDEX: 22.57 KG/M2 | TEMPERATURE: 97.1 F | SYSTOLIC BLOOD PRESSURE: 111 MMHG | HEART RATE: 68 BPM | DIASTOLIC BLOOD PRESSURE: 75 MMHG

## 2023-04-26 DIAGNOSIS — R10.30 LOWER ABDOMINAL PAIN: Primary | ICD-10-CM

## 2023-04-26 DIAGNOSIS — R10.13 EPIGASTRIC PAIN: ICD-10-CM

## 2023-04-26 DIAGNOSIS — K59.1 FUNCTIONAL DIARRHEA: ICD-10-CM

## 2023-04-26 NOTE — PROGRESS NOTES
"Chief Complaint  Constipation, Diarrhea, Abdominal Pain, and Heartburn    Subjective          History of Present Illness    Lilia Lae is a  43 y.o. female presents for evaluation of constipation, diarrhea, abdominal pain, and heartburn.  She is a patient of Dr. Valenzuela last seen in 2021.    She reports intermittent episodes of severe lower abdominal pain relieved with a BM. She also reports severe epigastric pain: 2 episodes 3-4 weeks after hysterectomy/inguinal hernia repair. Mylanta and antacid seemed to help. Occasional heartburn. She takes occasional NSAIDS.     Stool consistency can be normal or watery.  Increased frequency/diarrhea couple times a month. Associated with gas and bloating.  Feels much better after BM, worse after eating.  Denies hematochezia, melena. No nocturnal awakenings. Weight is stable. Eats healthy.     Recently underwent lap robotic assisted left inguinal hernia repair with mesh and hysterectomy on 2/6/2023 with Dr. Ellis and Dr. Juarez.    She had colonoscopy between 12 to 15 years ago which was reportedly normal per pt.  Possible family history of colon cancer in Great Aunt and Uncle on her mother side. Her oldest son has IBD on pentasa, gluten/dairy free (also has Down's syndrome), cousins with Crohn's disease and Ulcerative Colitis.     Objective   Vital Signs:   /75   Pulse 68   Temp 97.1 °F (36.2 °C)   Ht 160 cm (63\")   Wt 57.8 kg (127 lb 6.4 oz)   BMI 22.57 kg/m²       Physical Exam  Vitals reviewed.   Constitutional:       General: She is awake. She is not in acute distress.     Appearance: Normal appearance. She is well-developed and well-groomed.   HENT:      Head: Normocephalic.   Pulmonary:      Effort: Pulmonary effort is normal. No respiratory distress.   Skin:     Coloration: Skin is not pale.   Neurological:      Mental Status: She is alert and oriented to person, place, and time.      Gait: Gait is intact.   Psychiatric:         Mood and Affect: Mood " and affect normal.         Speech: Speech normal.         Behavior: Behavior is cooperative.         Judgment: Judgment normal.          Result Review :             Assessment and Plan    Diagnoses and all orders for this visit:    1. Lower abdominal pain (Primary)  -     Case Request; Standing  -     Implement Anesthesia orders day of procedure.; Standing  -     Obtain informed consent; Standing  -     Verify bowel prep was successful; Standing  -     Give tap water enema if bowel prep was insufficient; Standing  -     Case Request    2. Epigastric pain  -     Case Request; Standing  -     Implement Anesthesia orders day of procedure.; Standing  -     Obtain informed consent; Standing  -     Verify bowel prep was successful; Standing  -     Give tap water enema if bowel prep was insufficient; Standing  -     Case Request    3. Functional diarrhea  -     Case Request; Standing  -     Implement Anesthesia orders day of procedure.; Standing  -     Obtain informed consent; Standing  -     Verify bowel prep was successful; Standing  -     Give tap water enema if bowel prep was insufficient; Standing  -     Case Request    Other orders  -     hyoscyamine (LEVSIN) 0.125 MG SL tablet; Place 1 tablet under the tongue 4 (Four) Times a Day With Meals & at Bedtime.  Dispense: 20 tablet; Refill: 2    Recommend trial of Levsin as needed for her abdominal cramping which tends to be severe when she has it.  We will proceed with upper and lower endoscopy given symptoms and family history of IBD including in her son.  New episodes of epigastric pain.  We will proceed with EGD for this reason.      Follow Up   Return for EGD, Colonoscopy.    Dragon dictation used throughout this note.     Umm Dawson PA-C

## 2023-08-10 ENCOUNTER — TELEPHONE (OUTPATIENT)
Dept: GASTROENTEROLOGY | Facility: CLINIC | Age: 43
End: 2023-08-10
Payer: COMMERCIAL

## 2023-08-17 ENCOUNTER — HOSPITAL ENCOUNTER (OUTPATIENT)
Facility: HOSPITAL | Age: 43
Setting detail: HOSPITAL OUTPATIENT SURGERY
Discharge: HOME | End: 2023-08-17
Attending: INTERNAL MEDICINE | Admitting: INTERNAL MEDICINE
Payer: COMMERCIAL

## 2023-08-17 ENCOUNTER — ANESTHESIA EVENT (OUTPATIENT)
Dept: GASTROENTEROLOGY | Facility: HOSPITAL | Age: 43
End: 2023-08-17
Payer: COMMERCIAL

## 2023-08-17 ENCOUNTER — ANESTHESIA (OUTPATIENT)
Dept: GASTROENTEROLOGY | Facility: HOSPITAL | Age: 43
End: 2023-08-17
Payer: COMMERCIAL

## 2023-08-17 VITALS
SYSTOLIC BLOOD PRESSURE: 119 MMHG | HEART RATE: 65 BPM | WEIGHT: 123 LBS | RESPIRATION RATE: 16 BRPM | OXYGEN SATURATION: 99 % | HEIGHT: 63 IN | DIASTOLIC BLOOD PRESSURE: 84 MMHG | BODY MASS INDEX: 21.79 KG/M2

## 2023-08-17 DIAGNOSIS — K59.1 FUNCTIONAL DIARRHEA: ICD-10-CM

## 2023-08-17 DIAGNOSIS — R10.30 LOWER ABDOMINAL PAIN: ICD-10-CM

## 2023-08-17 DIAGNOSIS — R10.13 EPIGASTRIC PAIN: ICD-10-CM

## 2023-08-17 PROCEDURE — 43239 EGD BIOPSY SINGLE/MULTIPLE: CPT | Performed by: INTERNAL MEDICINE

## 2023-08-17 PROCEDURE — 88305 TISSUE EXAM BY PATHOLOGIST: CPT | Performed by: INTERNAL MEDICINE

## 2023-08-17 PROCEDURE — 25010000002 PROPOFOL 10 MG/ML EMULSION: Performed by: ANESTHESIOLOGY

## 2023-08-17 PROCEDURE — 45380 COLONOSCOPY AND BIOPSY: CPT | Performed by: INTERNAL MEDICINE

## 2023-08-17 RX ORDER — PROPOFOL 10 MG/ML
VIAL (ML) INTRAVENOUS AS NEEDED
Status: DISCONTINUED | OUTPATIENT
Start: 2023-08-17 | End: 2023-08-17 | Stop reason: SURG

## 2023-08-17 RX ORDER — SODIUM CHLORIDE, SODIUM LACTATE, POTASSIUM CHLORIDE, CALCIUM CHLORIDE 600; 310; 30; 20 MG/100ML; MG/100ML; MG/100ML; MG/100ML
1000 INJECTION, SOLUTION INTRAVENOUS CONTINUOUS
Status: DISCONTINUED | OUTPATIENT
Start: 2023-08-17 | End: 2023-08-17 | Stop reason: HOSPADM

## 2023-08-17 RX ORDER — LIDOCAINE HYDROCHLORIDE 10 MG/ML
0.5 INJECTION, SOLUTION INFILTRATION; PERINEURAL ONCE AS NEEDED
Status: DISCONTINUED | OUTPATIENT
Start: 2023-08-17 | End: 2023-08-17 | Stop reason: HOSPADM

## 2023-08-17 RX ORDER — OMEPRAZOLE 20 MG/1
20 CAPSULE, DELAYED RELEASE ORAL DAILY
Qty: 30 CAPSULE | Refills: 5 | Status: SHIPPED | OUTPATIENT
Start: 2023-08-17

## 2023-08-17 RX ORDER — SODIUM CHLORIDE 0.9 % (FLUSH) 0.9 %
10 SYRINGE (ML) INJECTION AS NEEDED
Status: DISCONTINUED | OUTPATIENT
Start: 2023-08-17 | End: 2023-08-17 | Stop reason: HOSPADM

## 2023-08-17 RX ORDER — GLYCOPYRROLATE 0.2 MG/ML
INJECTION INTRAMUSCULAR; INTRAVENOUS AS NEEDED
Status: DISCONTINUED | OUTPATIENT
Start: 2023-08-17 | End: 2023-08-17 | Stop reason: SURG

## 2023-08-17 RX ORDER — LIDOCAINE HYDROCHLORIDE 20 MG/ML
INJECTION, SOLUTION INFILTRATION; PERINEURAL AS NEEDED
Status: DISCONTINUED | OUTPATIENT
Start: 2023-08-17 | End: 2023-08-17 | Stop reason: SURG

## 2023-08-17 RX ADMIN — GLYCOPYRROLATE 0.2 MG: 0.2 INJECTION INTRAMUSCULAR; INTRAVENOUS at 08:45

## 2023-08-17 RX ADMIN — PROPOFOL 350 MG: 10 INJECTION, EMULSION INTRAVENOUS at 08:47

## 2023-08-17 RX ADMIN — LIDOCAINE HYDROCHLORIDE 100 MG: 20 INJECTION, SOLUTION INFILTRATION; PERINEURAL at 08:45

## 2023-08-17 RX ADMIN — SODIUM CHLORIDE, POTASSIUM CHLORIDE, SODIUM LACTATE AND CALCIUM CHLORIDE 1000 ML: 600; 310; 30; 20 INJECTION, SOLUTION INTRAVENOUS at 08:37

## 2023-08-17 NOTE — H&P
[facility]  Pre Procedure History & Physical    Chief Complaint:   Abdominal pain    Subjective     HPI:   43 y.o. female here for EGD/colonoscopy to evaluate various chronic GI symptoms    Past Medical History:   Past Medical History:   Diagnosis Date    Dysmenorrhea     Feeling anxious     R/T SURGERY    History of bilateral breast implants     History of COVID-19     History of obstetrical complication     H/O shoulder dystocia(G2) and Downs Syndrome(G1)    Inguinal hernia of left side without obstruction or gangrene     Irritable bowel syndrome     Menorrhagia     Migraine        Past Surgical History:  Past Surgical History:   Procedure Laterality Date    BREAST AUGMENTATION  05/26/2014    COLONOSCOPY  2008    D & C HYSTEROSCOPY N/A 02/11/2013    Diagnostic laparoscopy, ablation of minimal endometriosis, chrompertubation, diagnostic hysteroscopy, dilatation and curettage-Dr. Gerber Juarez    D & C HYSTEROSCOPY ENDOMETRIAL ABLATION AND TUBAL STERILIZATION N/A 11/18/2013    Laparoscopic tubal ligation via coagulation, Diagnostic hysteroscopy, D&C and ThermaChoice ablation-Dr. Gerber Juarez    ENDOSCOPY AND COLONOSCOPY N/A 03/2009    Normal    INGUINAL HERNIA REPAIR Left 02/06/2023    Procedure: Laparoscopic robotic assisted left inguinal hernia repair with mesh;  Surgeon: Shahida Ellis MD;  Location: [facility];  Service: Robotics - DaVinci;  Laterality: Left;    INNER EAR SURGERY  1996    ear drum surgery w/ reconstruction in 1996    MIDDLE EAR SURGERY  2021    TONSILLECTOMY AND ADENOIDECTOMY Bilateral     TOTAL LAPAROSCOPIC HYSTERECTOMY Bilateral 02/06/2023    Procedure: DA RAYMOND ROBOT TOTAL LAPAROSCOPIC HYSTERECTOMY BILATERAL SALPINGECTOMY;  Surgeon: Gerber Juarez MD;  Location: [facility] OR;  Service: Robotics - DaVinci;  Laterality: Bilateral;    UMBILICAL HERNIA REPAIR N/A 06/15/2018    Procedure: OPEN VENTRAL HERNIA REPAIR;  Surgeon: Shahida Ellis MD;   [place] [facility] [place] [facility]  Service: General    VAGINAL DELIVERY N/A 01/27/2007    Dr. Jacinta Rice    VAGINAL DELIVERY N/A 07/27/2005    Dr. Jacinta Rice       Family History:  Family History   Problem Relation Age of Onset    Hypertension Mother     Hearing loss Mother     Irritable bowel syndrome Mother     Hypertension Father     Hyperlipidemia Father     Heart disease Maternal Grandfather     Heart attack Maternal Grandfather     Hearing loss Maternal Grandfather     Diabetes Paternal Grandmother     Down syndrome Son         G1, he has an ASD    Birth defects Son     Hearing loss Maternal Uncle     Hearing loss Maternal Grandmother     Colon cancer Other     Malig Hyperthermia Neg Hx        Social History:   reports that she has never smoked. She has never used smokeless tobacco. She reports current alcohol use of about 2.0 standard drinks per week. She reports that she does not use drugs.    Medications:   Medications Prior to Admission   Medication Sig Dispense Refill Last Dose    EPINEPHrine (EPIPEN) 0.3 MG/0.3ML solution auto-injector injection INJECT CONTENTS OF 1 PEN AS NEEDED FOR ALLERGIC REACTION       hyoscyamine (LEVSIN) 0.125 MG SL tablet Place 1 tablet under the tongue 4 (Four) Times a Day With Meals & at Bedtime. 20 tablet 2     magnesium oxide (MAGOX) 400 (241.3 Mg) MG tablet tablet Take 1 tablet by mouth Daily As Needed.       Multiple Vitamins-Minerals (MULTIVITAMIN PO) Take 1 tablet by mouth Daily.       Vitamin D, Cholecalciferol, (CHOLECALCIFEROL) 10 MCG (400 UNIT) tablet Take 1 tablet by mouth Daily.          Allergies:  Shellfish-derived products and Cefaclor    ROS:    Pertinent items are noted in HPI     Objective     There were no vitals taken for this visit.    Physical Exam   Constitutional: Pt is oriented to person, place, and time and well-developed, well-nourished, and in no distress.   Mouth/Throat: Oropharynx is clear and moist.   Neck: Normal range of motion.   Cardiovascular: Normal  rate, regular rhythm and normal heart sounds.    Pulmonary/Chest: Effort normal and breath sounds normal.   Abdominal: Soft. Nontender  Skin: Skin is warm and dry.   Psychiatric: Mood, memory, affect and judgment normal.     Assessment & Plan     Diagnosis:  Abdominal pain    Anticipated Surgical Procedure:  EGD  Colonoscopy    The risks, benefits, and alternatives of this procedure have been discussed with the patient or the responsible party- the patient understands and agrees to proceed.

## 2023-08-17 NOTE — ANESTHESIA POSTPROCEDURE EVALUATION
"Patient: Lilia Lea    Procedure Summary       Date: 08/17/23 Room / Location:  [facility] /  [facility]    Anesthesia Start: 0837 Anesthesia Stop: 0911    Procedures:       COLONOSCOPY TO CECUM AND TI WITH RANDOM COLON BX'S      ESOPHAGOGASTRODUODENOSCOPY WITH BX (Esophagus) Diagnosis:       Lower abdominal pain      Epigastric pain      Functional diarrhea      (Lower abdominal pain [R10.30])      (Epigastric pain [R10.13])      (Functional diarrhea [K59.1])    Surgeons: Ciaran Valenzuela MD Provider: Sami Barker MD    Anesthesia Type: MAC ASA Status: 2            Anesthesia Type: MAC    Vitals  Vitals Value Taken Time   /84 08/17/23 0928   Temp     Pulse 65 08/17/23 0928   Resp 16 08/17/23 0928   SpO2 99 % 08/17/23 0928           Post Anesthesia Care and Evaluation    Patient location during evaluation: bedside  Patient participation: complete - patient participated  Level of consciousness: awake and alert  Pain management: adequate    Airway patency: patent  Anesthetic complications: No anesthetic complications    Cardiovascular status: acceptable  Respiratory status: acceptable  Hydration status: acceptable    Comments: /84 (BP Location: Left arm, Patient Position: Lying)   Pulse 65   Resp 16   Ht 160 cm (63\")   Wt 55.8 kg (123 lb)   LMP 01/16/2023   SpO2 99%   BMI 21.79 kg/mý     "

## 2023-08-17 NOTE — ANESTHESIA PREPROCEDURE EVALUATION
Anesthesia Evaluation     NPO Solid Status: > 8 hours             Airway   Mallampati: II  TM distance: >3 FB  Neck ROM: full  no difficulty expected  Dental - normal exam     Pulmonary - normal exam   Cardiovascular - normal exam        Neuro/Psych  (+) headaches, psychiatric history Anxiety  GI/Hepatic/Renal/Endo      Musculoskeletal     Abdominal  - normal exam   Substance History      OB/GYN          Other                      Anesthesia Plan    ASA 2     MAC     intravenous induction     Anesthetic plan, risks, benefits, and alternatives have been provided, discussed and informed consent has been obtained with: patient.    CODE STATUS:

## 2023-08-18 LAB
LAB AP CASE REPORT: NORMAL
LAB AP DIAGNOSIS COMMENT: NORMAL
PATH REPORT.FINAL DX SPEC: NORMAL
PATH REPORT.GROSS SPEC: NORMAL

## 2023-09-05 NOTE — PROGRESS NOTES
Benign EGD bx  Colon bx could not rule out element of mild microscopic colitis.  Would recommend questran 4gm Q/day, office f/u with me or APC in 4-6 weeks.

## 2023-09-06 ENCOUNTER — TELEPHONE (OUTPATIENT)
Dept: GASTROENTEROLOGY | Facility: CLINIC | Age: 43
End: 2023-09-06
Payer: COMMERCIAL

## 2023-09-06 NOTE — TELEPHONE ENCOUNTER
----- Message from Ciaran Valenzuela MD sent at 9/5/2023  1:17 PM EDT -----  Benign EGD bx  Colon bx could not rule out element of mild microscopic colitis.  Would recommend questran 4gm Q/day, office f/u with me or APC in 4-6 weeks.    
Patient called. Advised as per Dr. Valenzuela's note. She states she does not have diarrhea every day. Would like to hold off taking Questran until her follow up visit.     F/u with FRANCISCO Lovelace on 10/11@0800.   
pushing for 3.5 hrs

## 2023-10-11 ENCOUNTER — OFFICE VISIT (OUTPATIENT)
Dept: GASTROENTEROLOGY | Facility: CLINIC | Age: 43
End: 2023-10-11
Payer: COMMERCIAL

## 2023-10-11 VITALS
HEIGHT: 63 IN | BODY MASS INDEX: 22.01 KG/M2 | OXYGEN SATURATION: 98 % | SYSTOLIC BLOOD PRESSURE: 106 MMHG | HEART RATE: 70 BPM | TEMPERATURE: 96.9 F | DIASTOLIC BLOOD PRESSURE: 79 MMHG | WEIGHT: 124.2 LBS

## 2023-10-11 DIAGNOSIS — K52.832 LYMPHOCYTIC COLITIS: Primary | ICD-10-CM

## 2023-10-11 DIAGNOSIS — R12 HEARTBURN: ICD-10-CM

## 2023-10-11 NOTE — PROGRESS NOTES
"Chief Complaint  Abdominal Pain    Subjective          History Of Present Illness:    Lilia Lea is a  43 y.o. female Patient of Dr. Dotson with history of abdominal pain, heartburn, diarrhea.    Patient reports following her EGD and colonoscopy she has had improvement in her symptoms.  She felt that she potentially had a stomach bug during her last visit as she had improvement in her abdominal pain soon after her office visit with MERLINE Dawson.  She reports she still has occasional intermittent diarrhea but it is infrequent.  No melena or hematochezia.  Her appetite is good and her weight is stable.  She does have occasional reflux but has not been taking Prilosec daily.  No odynophagia, dysphagia.    EGD and colonoscopy reviewed from 8/17/2023.  C-scope with normal TI, normal-appearing colon. Biopsies potentially consistent with developing microscopic colitis. EGD with normal esophagus, gastritis, normal duodenum.  Gastric biopsies benign.    Objective   Vital Signs:   /79   Pulse 70   Temp 96.9 øF (36.1 øC)   Ht 160 cm (63\")   Wt 56.3 kg (124 lb 3.2 oz)   SpO2 98%   BMI 22.00 kg/mý       Physical Exam  Vitals reviewed.   Constitutional:       General: She is not in acute distress.     Appearance: Normal appearance. She is not ill-appearing.   HENT:      Head: Normocephalic and atraumatic.      Nose: Nose normal.      Mouth/Throat:      Pharynx: Oropharynx is clear.   Eyes:      Extraocular Movements: Extraocular movements intact.      Conjunctiva/sclera: Conjunctivae normal.      Pupils: Pupils are equal, round, and reactive to light.   Pulmonary:      Effort: Pulmonary effort is normal.   Abdominal:      General: There is no distension.      Palpations: There is no mass.      Tenderness: There is no abdominal tenderness.   Musculoskeletal:         General: No swelling. Normal range of motion.      Cervical back: Normal range of motion.   Skin:     General: Skin is warm and dry.      " Findings: No bruising or rash.   Neurological:      General: No focal deficit present.      Mental Status: She is alert and oriented to person, place, and time.      Motor: No weakness.      Gait: Gait normal.   Psychiatric:         Mood and Affect: Mood normal.          Result Review :   The following data was reviewed by: Albania Ulloa PA-C on 10/11/2023:  CMP          2/1/2023    11:25   CMP   Glucose 77    BUN 23    Creatinine 0.88    EGFR 85.3    Sodium 139    Potassium 4.4    Chloride 100    Calcium 9.0    BUN/Creatinine Ratio 26.1    Anion Gap 10.5      CBC          2/1/2023    11:25   CBC   WBC 7.54    RBC 4.62    Hemoglobin 14.4    Hematocrit 43.2    MCV 93.5    MCH 31.2    MCHC 33.3    RDW 11.4    Platelets 360            Assessment and Plan    Diagnoses and all orders for this visit:    1. Lymphocytic colitis (Primary)    2. Heartburn       Patient does not currently feel that she needs anything for her diarrhea.  She reports that it is intermittent and would like to avoid medications at this time.  I did recommend a fiber supplement as an option as well as increasing fiber in her diet.  Patient had multiple questions in regards to the side effects of PPIs.  We had a long discussion about this and she reports that she only has reflux occasionally. I recommended she could use Pepcid as needed.    Follow Up   Return if symptoms worsen or fail to improve.    Dragon dictation used throughout this note.            Albania Hernandez PA-C   [facility]  [address] [place] [address]  Office: [phone]

## (undated) DEVICE — SINGLE-USE BIOPSY FORCEPS: Brand: RADIAL JAW 4

## (undated) DEVICE — COVER,MAYO STAND,STERILE: Brand: MEDLINE

## (undated) DEVICE — SKIN PREP TRAY W/CHG: Brand: MEDLINE INDUSTRIES, INC.

## (undated) DEVICE — SOL NACL 0.9PCT 1000ML

## (undated) DEVICE — DRAPE,REIN 53X77,STERILE: Brand: MEDLINE

## (undated) DEVICE — CANNULA SEAL

## (undated) DEVICE — LN SMPL CO2 SHTRM SD STREAM W/M LUER

## (undated) DEVICE — TUBING, SUCTION, 1/4" X 20', STRAIGHT: Brand: MEDLINE INDUSTRIES, INC.

## (undated) DEVICE — BLADELESS OBTURATOR: Brand: WECK VISTA

## (undated) DEVICE — PATIENT RETURN ELECTRODE, SINGLE-USE, CONTACT QUALITY MONITORING, ADULT, WITH 9FT CORD, FOR PATIENTS WEIGING OVER 33LBS. (15KG): Brand: MEGADYNE

## (undated) DEVICE — THE STERILE LIGHT HANDLE COVER IS USED WITH STERIS SURGICAL LIGHTING AND VISUALIZATION SYSTEMS.

## (undated) DEVICE — COLUMN DRAPE

## (undated) DEVICE — ELECTRD BLD EDGE/INSUL1P 2.4X5.1MM STRL

## (undated) DEVICE — SUT VIC 3/0 SH 27IN J416H

## (undated) DEVICE — SOL ANTISTICK CAUTRY ELECTROLUBE LF

## (undated) DEVICE — UNDYED BRAIDED (POLYGLACTIN 910), SYNTHETIC ABSORBABLE SUTURE: Brand: COATED VICRYL

## (undated) DEVICE — KT ORCA ORCAPOD DISP STRL

## (undated) DEVICE — SUT VIC 3/0 TIES 18IN J110T

## (undated) DEVICE — GLV SURG BIOGEL LTX PF 6

## (undated) DEVICE — LOU LITHOTOMY ROBOTIC: Brand: MEDLINE INDUSTRIES, INC.

## (undated) DEVICE — ADAPT CLN BIOGUARD AIR/H2O DISP

## (undated) DEVICE — CATHETER,FOLEY,100%SILICONE,16FR,10ML,LF: Brand: MEDLINE

## (undated) DEVICE — PENCL ES MEGADINE EZ/CLEAN BUTN W/HOLSTR 10FT

## (undated) DEVICE — SENSR O2 OXIMAX FNGR A/ 18IN NONSTR

## (undated) DEVICE — APPL CHLORAPREP W/TINT 26ML ORNG

## (undated) DEVICE — VIOLET BRAIDED (POLYGLACTIN 910), SYNTHETIC ABSORBABLE SUTURE: Brand: COATED VICRYL

## (undated) DEVICE — GOWN,SIRUS,NON REINFRCD,LARGE,SET IN SL: Brand: MEDLINE

## (undated) DEVICE — SUT ETHIB 0/0 MO6 I8IN CX45D

## (undated) DEVICE — ADHS SKIN SURG TISS VISC PREMIERPRO EXOFIN HI/VISC FAST/DRY

## (undated) DEVICE — LAPAROVUE VISIBILITY SYSTEM LAPAROSCOPIC SOLUTIONS: Brand: LAPAROVUE

## (undated) DEVICE — LOU GENERAL ROBOT: Brand: MEDLINE INDUSTRIES, INC.

## (undated) DEVICE — MANIP UTER RUMI 2 KOH EFFICIENT 3CM BL

## (undated) DEVICE — TUBING, SUCTION, 1/4" X 10', STRAIGHT: Brand: MEDLINE

## (undated) DEVICE — VESSEL SEALER EXTEND: Brand: ENDOWRIST

## (undated) DEVICE — PK PROC MINOR TOWER 40

## (undated) DEVICE — DRSNG WND BORDR/ADHS NONADHR/GZ LF 2X2IN STRL

## (undated) DEVICE — TIP COVER ACCESSORY

## (undated) DEVICE — LAPAROSCOPIC SMOKE FILTRATION SYSTEM: Brand: PALL LAPAROSHIELD® PLUS LAPAROSCOPIC SMOKE FILTRATION SYSTEM

## (undated) DEVICE — ARM DRAPE

## (undated) DEVICE — GLV SURG SIGNATURE ESSENTIAL PF LTX SZ6

## (undated) DEVICE — MANIP UTER RUMI TP 6.7MM 10CM GRN

## (undated) DEVICE — BITEBLOCK OMNI BLOC

## (undated) DEVICE — ADHS SKIN DERMABOND TOP ADVANCED

## (undated) DEVICE — ANTIBACTERIAL UNDYED BRAIDED (POLYGLACTIN 910), SYNTHETIC ABSORBABLE SUTURE: Brand: COATED VICRYL

## (undated) DEVICE — SUT MNCRYL PLS ANTIB UD 4/0 PS2 18IN

## (undated) DEVICE — ENDOPATH XCEL BLADELESS TROCARS WITH STABILITY SLEEVES: Brand: ENDOPATH XCEL

## (undated) DEVICE — GLV SURG BIOGEL LTX PF 5 1/2

## (undated) DEVICE — COVER,LIGHT HANDLE,FLX,2/PK: Brand: MEDLINE INDUSTRIES, INC.

## (undated) DEVICE — GLV SURG SENSICARE POLYISPRN W/ALOE PF LF 6.5 GRN STRL

## (undated) DEVICE — MSK PROC CURAPLEX O2 2/ADAPT 7FT